# Patient Record
Sex: FEMALE | Race: WHITE | Employment: OTHER | URBAN - METROPOLITAN AREA
[De-identification: names, ages, dates, MRNs, and addresses within clinical notes are randomized per-mention and may not be internally consistent; named-entity substitution may affect disease eponyms.]

---

## 2017-04-21 ENCOUNTER — HOSPITAL ENCOUNTER (OUTPATIENT)
Dept: MAMMOGRAPHY | Age: 62
Discharge: HOME | End: 2017-04-21
Attending: SPECIALIST
Payer: COMMERCIAL

## 2017-04-21 DIAGNOSIS — Z12.31 VISIT FOR SCREENING MAMMOGRAM: ICD-10-CM

## 2017-04-21 PROCEDURE — 77067 SCR MAMMO BI INCL CAD: CPT

## 2017-06-30 ENCOUNTER — HOSPITAL ENCOUNTER (EMERGENCY)
Age: 62
Discharge: HOME | End: 2017-06-30
Attending: EMERGENCY MEDICINE
Payer: COMMERCIAL

## 2017-06-30 ENCOUNTER — APPOINTMENT (OUTPATIENT)
Dept: GENERAL RADIOLOGY | Age: 62
End: 2017-06-30
Attending: EMERGENCY MEDICINE
Payer: COMMERCIAL

## 2017-06-30 VITALS
RESPIRATION RATE: 18 BRPM | BODY MASS INDEX: 27.78 KG/M2 | TEMPERATURE: 97.9 F | OXYGEN SATURATION: 97 % | DIASTOLIC BLOOD PRESSURE: 75 MMHG | HEIGHT: 64 IN | HEART RATE: 60 BPM | WEIGHT: 162.7 LBS | SYSTOLIC BLOOD PRESSURE: 136 MMHG

## 2017-06-30 DIAGNOSIS — R07.9 CHEST PAIN, UNSPECIFIED TYPE: Primary | ICD-10-CM

## 2017-06-30 LAB
ALBUMIN SERPL BCP-MCNC: 4.4 G/DL (ref 3.5–5)
ALBUMIN/GLOB SERPL: 1.2 {RATIO} (ref 1.1–2.2)
ALP SERPL-CCNC: 127 U/L (ref 45–117)
ALT SERPL-CCNC: 40 U/L (ref 12–78)
ANION GAP BLD CALC-SCNC: 9 MMOL/L (ref 5–15)
AST SERPL W P-5'-P-CCNC: 26 U/L (ref 15–37)
BASOPHILS # BLD AUTO: 0 K/UL (ref 0–0.1)
BASOPHILS # BLD: 0 % (ref 0–1)
BILIRUB SERPL-MCNC: 0.4 MG/DL (ref 0.2–1)
BNP SERPL-MCNC: 226 PG/ML (ref 0–125)
BUN SERPL-MCNC: 16 MG/DL (ref 6–20)
BUN/CREAT SERPL: 24 (ref 12–20)
CALCIUM SERPL-MCNC: 10.1 MG/DL (ref 8.5–10.1)
CHLORIDE SERPL-SCNC: 103 MMOL/L (ref 97–108)
CO2 SERPL-SCNC: 28 MMOL/L (ref 21–32)
CREAT SERPL-MCNC: 0.67 MG/DL (ref 0.55–1.02)
D DIMER PPP FEU-MCNC: 0.19 MG/L FEU (ref 0–0.65)
EOSINOPHIL # BLD: 0 K/UL (ref 0–0.4)
EOSINOPHIL NFR BLD: 0 % (ref 0–7)
ERYTHROCYTE [DISTWIDTH] IN BLOOD BY AUTOMATED COUNT: 14.3 % (ref 11.5–14.5)
GLOBULIN SER CALC-MCNC: 3.8 G/DL (ref 2–4)
GLUCOSE SERPL-MCNC: 110 MG/DL (ref 65–100)
HCT VFR BLD AUTO: 47.9 % (ref 35–47)
HGB BLD-MCNC: 16.1 G/DL (ref 11.5–16)
LIPASE SERPL-CCNC: 139 U/L (ref 73–393)
LYMPHOCYTES # BLD AUTO: 20 % (ref 12–49)
LYMPHOCYTES # BLD: 1.5 K/UL (ref 0.8–3.5)
MCH RBC QN AUTO: 29.5 PG (ref 26–34)
MCHC RBC AUTO-ENTMCNC: 33.6 G/DL (ref 30–36.5)
MCV RBC AUTO: 87.7 FL (ref 80–99)
MONOCYTES # BLD: 0.5 K/UL (ref 0–1)
MONOCYTES NFR BLD AUTO: 6 % (ref 5–13)
NEUTS SEG # BLD: 5.6 K/UL (ref 1.8–8)
NEUTS SEG NFR BLD AUTO: 74 % (ref 32–75)
PLATELET # BLD AUTO: 235 K/UL (ref 150–400)
POTASSIUM SERPL-SCNC: 4.1 MMOL/L (ref 3.5–5.1)
PROT SERPL-MCNC: 8.2 G/DL (ref 6.4–8.2)
RBC # BLD AUTO: 5.46 M/UL (ref 3.8–5.2)
SODIUM SERPL-SCNC: 140 MMOL/L (ref 136–145)
TROPONIN I SERPL-MCNC: <0.04 NG/ML
TROPONIN I SERPL-MCNC: <0.04 NG/ML
WBC # BLD AUTO: 7.7 K/UL (ref 3.6–11)

## 2017-06-30 PROCEDURE — 99285 EMERGENCY DEPT VISIT HI MDM: CPT

## 2017-06-30 PROCEDURE — 80053 COMPREHEN METABOLIC PANEL: CPT | Performed by: EMERGENCY MEDICINE

## 2017-06-30 PROCEDURE — 93005 ELECTROCARDIOGRAM TRACING: CPT

## 2017-06-30 PROCEDURE — 85025 COMPLETE CBC W/AUTO DIFF WBC: CPT | Performed by: EMERGENCY MEDICINE

## 2017-06-30 PROCEDURE — 85379 FIBRIN DEGRADATION QUANT: CPT | Performed by: EMERGENCY MEDICINE

## 2017-06-30 PROCEDURE — 36415 COLL VENOUS BLD VENIPUNCTURE: CPT | Performed by: EMERGENCY MEDICINE

## 2017-06-30 PROCEDURE — 74011000250 HC RX REV CODE- 250: Performed by: EMERGENCY MEDICINE

## 2017-06-30 PROCEDURE — 84484 ASSAY OF TROPONIN QUANT: CPT | Performed by: EMERGENCY MEDICINE

## 2017-06-30 PROCEDURE — 83880 ASSAY OF NATRIURETIC PEPTIDE: CPT | Performed by: EMERGENCY MEDICINE

## 2017-06-30 PROCEDURE — 74011250637 HC RX REV CODE- 250/637: Performed by: EMERGENCY MEDICINE

## 2017-06-30 PROCEDURE — 83690 ASSAY OF LIPASE: CPT | Performed by: EMERGENCY MEDICINE

## 2017-06-30 RX ORDER — SODIUM CHLORIDE 0.9 % (FLUSH) 0.9 %
5-10 SYRINGE (ML) INJECTION EVERY 8 HOURS
Status: DISCONTINUED | OUTPATIENT
Start: 2017-06-30 | End: 2017-07-01 | Stop reason: HOSPADM

## 2017-06-30 RX ORDER — SODIUM CHLORIDE 0.9 % (FLUSH) 0.9 %
5-10 SYRINGE (ML) INJECTION AS NEEDED
Status: DISCONTINUED | OUTPATIENT
Start: 2017-06-30 | End: 2017-07-01 | Stop reason: HOSPADM

## 2017-06-30 RX ORDER — PANTOPRAZOLE SODIUM 40 MG/1
40 TABLET, DELAYED RELEASE ORAL DAILY
Qty: 30 TAB | Refills: 0 | Status: SHIPPED | OUTPATIENT
Start: 2017-06-30 | End: 2017-07-30

## 2017-06-30 RX ORDER — GUAIFENESIN 100 MG/5ML
81 LIQUID (ML) ORAL DAILY
Qty: 100 TAB | Refills: 0 | Status: SHIPPED | OUTPATIENT
Start: 2017-06-30 | End: 2017-07-21

## 2017-06-30 RX ADMIN — LIDOCAINE HYDROCHLORIDE 40 ML: 20 SOLUTION ORAL; TOPICAL at 17:45

## 2017-06-30 NOTE — ED NOTES
Patient resting comfortably, call bell w/in reach, no further needs expressed at this time, aware of POC.  at the bedside.

## 2017-06-30 NOTE — ED PROVIDER NOTES
HPI Comments: Oracio Jacobo is a 57 y.o. female with PMHx of HTN and hypercholesteremia, presenting ambulatory to ED c/o intermittent upper medial nonradiating pressure CP for the past week. Pt reports her CP is worse OE. She notes associated chest congestion for the past three days. Pt reports she has had intermittent burning CP radiating across her chest from shoulder to shoulder and notes episode while in the ED during examination. She endorses she had cough onset about one month ago and self diagnosed herself with bronchitis. Pt reports the cough resolved one week ago but notes she then had onset of current CP and congestion. She endorses she was seen for symptoms at [facility] three days ago and was rx'd prednisone without relief. Pt reports she went back to [facility] today, had CXR, and was rx'd amoxicillin. She endorses she is followed by Dr. Lorrain Apley with Cardiology at [facility] [address] [place] [address]. Pt notes she had a normal stress test four years ago. She endorses mother had MI at age 67. Pt reports history of asthma for which she has a rescue inhaler. She notes she has been using her inhaler without relief of her current symptoms. Pt denies history of COPD, blood clots, DM, and HTN. She denies history of abdominal surgeries/disease. Pt specifically denies SOB, difficulty swallowing, and abdominal pain. Cardiologist: Dr. Estelle Ogden  PCP: Willie Vera MD  Social Hx: current every day smoker (1 ppd); + EtOH (2 glasses of wine/day); There are no other complaints, changes, or physical findings at this time. Written by CHARAN Perez, as dictated by Lis Burgess MD.           The history is provided by the patient. Past Medical History:   Diagnosis Date    Depression     Hypercholesteremia     Hypercholesteremia     Hypertension        Past Surgical History:   Procedure Laterality Date    HX BUNIONECTOMY      HX TUBAL LIGATION           No family history on file.     Social History     Social History    Marital status:      Spouse name: N/A    Number of children: N/A    Years of education: N/A     Occupational History    Not on file. Social History Main Topics    Smoking status: Former Smoker     Quit date: 3/12/2008    Smokeless tobacco: Never Used    Alcohol use Not on file    Drug use: Not on file    Sexual activity: Not on file     Other Topics Concern    Not on file     Social History Narrative         ALLERGIES: Claritin [loratadine]; Levaquin [levofloxacin]; and Other medication    Review of Systems   Constitutional: Negative. Negative for chills and fever. HENT: Positive for congestion. Negative for rhinorrhea and trouble swallowing. Respiratory: Negative. Negative for cough, chest tightness and wheezing. Cardiovascular: Positive for chest pain (upper medial; radiating across chest from shoulder to shoulder). Negative for palpitations. Gastrointestinal: Negative. Negative for abdominal pain, constipation, nausea and vomiting. Endocrine: Negative. Genitourinary: Negative. Negative for decreased urine volume, flank pain, hematuria and pelvic pain. Musculoskeletal: Negative. Negative for back pain and neck pain. Skin: Negative. Negative for color change, pallor and rash. Neurological: Negative. Negative for dizziness, seizures, weakness, numbness and headaches. Hematological: Negative. Negative for adenopathy. Psychiatric/Behavioral: Negative. All other systems reviewed and are negative. Vitals:    06/30/17 1730 06/30/17 1800 06/30/17 1830 06/30/17 1900   BP: 146/80 140/83 147/76 137/76   Pulse: 61 66 61 63   Resp: 16 12 14 11   Temp:       SpO2: 99% 99% 95% 99%   Weight:       Height:                Physical Exam   Constitutional: She is oriented to person, place, and time. She appears well-developed and well-nourished. No distress. HENT:   Head: Normocephalic and atraumatic. Mouth/Throat: No oropharyngeal exudate. Eyes: Conjunctivae are normal. Pupils are equal, round, and reactive to light. Right eye exhibits no discharge. Left eye exhibits no discharge. No scleral icterus. Neck: Normal range of motion. Neck supple. No JVD present. Cardiovascular: Normal rate, regular rhythm, normal heart sounds and intact distal pulses. Exam reveals no gallop and no friction rub. No murmur heard. Pulmonary/Chest: Effort normal and breath sounds normal. No stridor. No respiratory distress. She has no wheezes. She has no rales. She exhibits no tenderness. Abdominal: Soft. Bowel sounds are normal. She exhibits no distension and no mass. There is no tenderness. There is no rebound and no guarding. Neurological: She is alert and oriented to person, place, and time. She displays normal reflexes. No cranial nerve deficit. She exhibits normal muscle tone. Coordination normal.   Skin: Skin is warm. No rash noted. She is not diaphoretic. No pallor. Nursing note and vitals reviewed. MDM  Number of Diagnoses or Management Options  Chest pain, unspecified type:   Diagnosis management comments: DDx: bronchitis, PNA, CAD, CHF, PE, esophageal spasm, hepatitis, pancreatitis, musculoskeletal pain. Impression Plan: history of tobacco abuse as well as HLD, presents with initial cough and congestion for three weeks that she self diagnosed with bronchitis. Subsequently F/U to [facility] and was given steroids on the first visit and amoxicillin today. She had a normal stress test four years ago. She is here with recurrent burning tightness in her chest. Has initial non-ischemic EKG. Will check enzymes and repeat EKG.  If remains normal, will F/U outpatient with her cardiologist.          Amount and/or Complexity of Data Reviewed  Clinical lab tests: ordered and reviewed  Tests in the medicine section of CPT®: ordered and reviewed  Obtain history from someone other than the patient: yes  Review and summarize past medical records: yes  Discuss the patient with other providers: yes (Cardiology)  Independent visualization of images, tracings, or specimens: yes    Risk of Complications, Morbidity, and/or Mortality  Presenting problems: moderate  Diagnostic procedures: moderate  Management options: moderate    Patient Progress  Patient progress: stable    Procedures    EKG interpretation: (Preliminary) 1656  Rhythm: sinus rhythm with short AK and incomplete RBBB; and regular . Rate (approx.): 67; Axis: normal; AK interval: short; QRS interval: normal ; ST/T wave: normal; no prior EKG for comparison. Written by CHARAN Colon, as dictated by Selma Ellis MD.     Progress Note:  5:21 PM  Reviewed the pt's CXR from [facility]. There are no acute abnormalities and the pt's heart is a normal size. Written by CHARAN Colon, as dictated by Selma Ellis MD.    CONSULT NOTE:   7:21 PM  Selma Ellis MD spoke with Dr. Irma Casper,   Specialty: Cardiology  Discussed pt's hx, disposition, and available diagnostic and imaging results. Reviewed care plans. Consultant agrees with plans as outlined. Dr. Irma Casper recommends repeating the enzymes and then the pt can be discharged.    Written by CHARAN Colon, as dictated by Selma Ellis MD.     LABORATORY TESTS:  Recent Results (from the past 12 hour(s))   EKG, 12 LEAD, INITIAL    Collection Time: 06/30/17  4:56 PM   Result Value Ref Range    Ventricular Rate 67 BPM    Atrial Rate 67 BPM    P-R Interval 96 ms    QRS Duration 110 ms    Q-T Interval 444 ms    QTC Calculation (Bezet) 469 ms    Calculated P Axis 58 degrees    Calculated R Axis 31 degrees    Calculated T Axis 32 degrees    Diagnosis       Sinus rhythm with short AK  Incomplete right bundle branch block  No previous ECGs available     CBC WITH AUTOMATED DIFF    Collection Time: 06/30/17  5:16 PM   Result Value Ref Range    WBC 7.7 3.6 - 11.0 K/uL    RBC 5.46 (H) 3.80 - 5.20 M/uL    HGB 16.1 (H) 11.5 - 16.0 g/dL    HCT 47.9 (H) 35.0 - 47.0 %    MCV 87.7 80.0 - 99.0 FL    MCH 29.5 26.0 - 34.0 PG    MCHC 33.6 30.0 - 36.5 g/dL    RDW 14.3 11.5 - 14.5 %    PLATELET 509 127 - 415 K/uL    NEUTROPHILS 74 32 - 75 %    LYMPHOCYTES 20 12 - 49 %    MONOCYTES 6 5 - 13 %    EOSINOPHILS 0 0 - 7 %    BASOPHILS 0 0 - 1 %    ABS. NEUTROPHILS 5.6 1.8 - 8.0 K/UL    ABS. LYMPHOCYTES 1.5 0.8 - 3.5 K/UL    ABS. MONOCYTES 0.5 0.0 - 1.0 K/UL    ABS. EOSINOPHILS 0.0 0.0 - 0.4 K/UL    ABS. BASOPHILS 0.0 0.0 - 0.1 K/UL   METABOLIC PANEL, COMPREHENSIVE    Collection Time: 06/30/17  5:16 PM   Result Value Ref Range    Sodium 140 136 - 145 mmol/L    Potassium 4.1 3.5 - 5.1 mmol/L    Chloride 103 97 - 108 mmol/L    CO2 28 21 - 32 mmol/L    Anion gap 9 5 - 15 mmol/L    Glucose 110 (H) 65 - 100 mg/dL    BUN 16 6 - 20 MG/DL    Creatinine 0.67 0.55 - 1.02 MG/DL    BUN/Creatinine ratio 24 (H) 12 - 20      GFR est AA >60 >60 ml/min/1.73m2    GFR est non-AA >60 >60 ml/min/1.73m2    Calcium 10.1 8.5 - 10.1 MG/DL    Bilirubin, total 0.4 0.2 - 1.0 MG/DL    ALT (SGPT) 40 12 - 78 U/L    AST (SGOT) 26 15 - 37 U/L    Alk.  phosphatase 127 (H) 45 - 117 U/L    Protein, total 8.2 6.4 - 8.2 g/dL    Albumin 4.4 3.5 - 5.0 g/dL    Globulin 3.8 2.0 - 4.0 g/dL    A-G Ratio 1.2 1.1 - 2.2     LIPASE    Collection Time: 06/30/17  5:16 PM   Result Value Ref Range    Lipase 139 73 - 393 U/L   D DIMER    Collection Time: 06/30/17  5:16 PM   Result Value Ref Range    D-dimer 0.19 0.00 - 0.65 mg/L FEU   TROPONIN I    Collection Time: 06/30/17  5:16 PM   Result Value Ref Range    Troponin-I, Qt. <0.04 <0.05 ng/mL   PRO-BNP    Collection Time: 06/30/17  5:16 PM   Result Value Ref Range    NT pro- (H) 0 - 125 PG/ML   TROPONIN I    Collection Time: 06/30/17  8:06 PM   Result Value Ref Range    Troponin-I, Qt. <0.04 <0.05 ng/mL     MEDICATIONS GIVEN:  Medications   sodium chloride (NS) flush 5-10 mL (not administered)   sodium chloride (NS) flush 5-10 mL (not administered)   mylanta/viscous lidocaine (RAINA)(GI COCKTAIL) (40 mL Oral Given 6/30/17 3295)       IMPRESSION:  1. Chest pain, unspecified type        PLAN:  1. Current Discharge Medication List      START taking these medications    Details   aspirin (ASPIRIN CHILDRENS) 81 mg chewable tablet Take 1 Tab by mouth daily. Qty: 100 Tab, Refills: 0      pantoprazole (PROTONIX) 40 mg tablet Take 1 Tab by mouth daily for 30 days. Qty: 30 Tab, Refills: 0         CONTINUE these medications which have NOT CHANGED    Details   lamoTRIgine (LAMICTAL) 200 mg tablet Take 200 mg by mouth daily. ibuprofen (ADVIL) 200 mg tablet Take  by mouth. simvastatin (ZOCOR) 20 mg tablet Take  by mouth nightly. multivitamin (ONE A DAY) tablet Take 1 Tab by mouth daily. allergy injection            2.   Follow-up Information     Follow up With Details Comments Contact Info    Wili Morrow MD Call in 2 days  [address]  [phone]      [facility] EMERGENCY DEPT  If symptoms worsen [address]  [phone]        Return to ED if worse     DISCHARGE NOTE  8:55 PM  The patient has been re-evaluated and is ready for discharge. Reviewed available results with patient. Counseled pt on diagnosis and care plan. Pt has expressed understanding, and all questions have been answered. Pt agrees with plan and agrees to F/U as recommended, or return to the ED if their sxs worsen. Discharge instructions have been provided and explained to the pt, along with reasons to return to the ED. Written by Panfilo Reyes, ED Scribe, as dictated by Odilia Mendoza MD.    This note is prepared by Panfilo Reyes, acting as Scribe for Odilia Mendoza MD.    Odilia Mendoza MD: The scribe's documentation has been prepared under my direction and personally reviewed by me in its entirety.  I confirm that the note above accurately reflects all work, treatment, procedures, and medical decision making performed by me.

## 2017-06-30 NOTE — ED NOTES
Patient presents to ED with C/O \"swelling and pressure\" in the throat, chest pressure, and burning from the shoulder to shoulder that started today. The pt stated the symptoms started at 3 pm and went away 30 minutes later. The pt denies trouble swallowing, SOB, N/V/D, fever, chills, and urinary symptoms. Patient is A&Ox3, call bell w/in reach, and aware of plan of care. The patient is in NAD.  at the bedside.

## 2017-07-01 LAB
ATRIAL RATE: 61 BPM
ATRIAL RATE: 67 BPM
CALCULATED P AXIS, ECG09: 44 DEGREES
CALCULATED P AXIS, ECG09: 58 DEGREES
CALCULATED R AXIS, ECG10: 12 DEGREES
CALCULATED R AXIS, ECG10: 31 DEGREES
CALCULATED T AXIS, ECG11: 14 DEGREES
CALCULATED T AXIS, ECG11: 32 DEGREES
DIAGNOSIS, 93000: NORMAL
DIAGNOSIS, 93000: NORMAL
P-R INTERVAL, ECG05: 90 MS
P-R INTERVAL, ECG05: 96 MS
Q-T INTERVAL, ECG07: 444 MS
Q-T INTERVAL, ECG07: 476 MS
QRS DURATION, ECG06: 110 MS
QRS DURATION, ECG06: 112 MS
QTC CALCULATION (BEZET), ECG08: 469 MS
QTC CALCULATION (BEZET), ECG08: 479 MS
VENTRICULAR RATE, ECG03: 61 BPM
VENTRICULAR RATE, ECG03: 67 BPM

## 2017-07-01 NOTE — ED NOTES
Patient discharged and given discharge instructions by Bhavya Anaya MD. Patient had an opportunity to ask questions. Patient verbalized understanding of discharge instructions. Patient ambulatory from ED, discharge instructions and prescriptions in hand. Patient accompanied by . Pt refused wheelchair.

## 2017-07-01 NOTE — DISCHARGE INSTRUCTIONS
Chest Pain: Care Instructions  Your Care Instructions  There are many things that can cause chest pain. Some are not serious and will get better on their own in a few days. But some kinds of chest pain need more testing and treatment. Your doctor may have recommended a follow-up visit in the next 8 to 12 hours. If you are not getting better, you may need more tests or treatment. Even though your doctor has released you, you still need to watch for any problems. The doctor carefully checked you, but sometimes problems can develop later. If you have new symptoms or if your symptoms do not get better, get medical care right away. If you have worse or different chest pain or pressure that lasts more than 5 minutes or you passed out (lost consciousness), call 911 or seek other emergency help right away. A medical visit is only one step in your treatment. Even if you feel better, you still need to do what your doctor recommends, such as going to all suggested follow-up appointments and taking medicines exactly as directed. This will help you recover and help prevent future problems. How can you care for yourself at home? · Rest until you feel better. · Take your medicine exactly as prescribed. Call your doctor if you think you are having a problem with your medicine. · Do not drive after taking a prescription pain medicine. When should you call for help? Call 911 if:  · You passed out (lost consciousness). · You have severe difficulty breathing. · You have symptoms of a heart attack. These may include:  ¨ Chest pain or pressure, or a strange feeling in your chest.  ¨ Sweating. ¨ Shortness of breath. ¨ Nausea or vomiting. ¨ Pain, pressure, or a strange feeling in your back, neck, jaw, or upper belly or in one or both shoulders or arms. ¨ Lightheadedness or sudden weakness. ¨ A fast or irregular heartbeat.   After you call 911, the  may tell you to chew 1 adult-strength or 2 to 4 low-dose aspirin. Wait for an ambulance. Do not try to drive yourself. Call your doctor today if:  · You have any trouble breathing. · Your chest pain gets worse. · You are dizzy or lightheaded, or you feel like you may faint. · You are not getting better as expected. · You are having new or different chest pain. Where can you learn more? Go to http://rozina-corinna.info/. Enter A120 in the search box to learn more about \"Chest Pain: Care Instructions. \"  Current as of: 2017  Content Version: 11.3  © 9579-3206 [employer]. Care instructions adapted under license by [employer] (which disclaims liability or warranty for this information). If you have questions about a medical condition or this instruction, always ask your healthcare professional. [address] any warranty or liability for your use of this information. PiniOn Activation    Thank you for requesting access to P[employer]On. Please follow the instructions below to securely access and download your online medical record. PiniOn allows you to send messages to your doctor, view your test results, renew your prescriptions, schedule appointments, and more. How Do I Sign Up? 1. In your internet browser, go to [email or web address]  2. Click on the First Time User? Click Here link in the Sign In box. You will be redirect to the New Member Sign Up page. 3. Enter your PiniOn Access Code exactly as it appears below. You will not need to use this code after youve completed the sign-up process. If you do not sign up before the expiration date, you must request a new code. PiniOn Access Code: [email or web address]  Expires: 2017 11:06 AM (This is the date your PiniOn access code will )    4. Enter the last four digits of your Social Security Number (xxxx) and Date of Birth (mm/dd/yyyy) as indicated and click Submit. You will be taken to the next sign-up page.   5. Create a PiniOn ID. This will be your Cook[employer] login ID and cannot be changed, so think of one that is secure and easy to remember. 6. Create a Cook[employer] password. You can change your password at any time. 7. Enter your Password Reset Question and Answer. This can be used at a later time if you forget your password. 8. Enter your e-mail address. You will receive e-mail notification when new information is available in [address] 9. Click Sign Up. You can now view and download portions of your medical record. 10. Click the Download Summary menu link to download a portable copy of your medical information. Additional Information    If you have questions, please visit the Frequently Asked Questions section of the [email or web address] website at [email or web address] Remember, [email or web address] is NOT to be used for urgent needs. For medical emergencies, dial 911.

## 2017-07-11 ENCOUNTER — APPOINTMENT (OUTPATIENT)
Dept: GENERAL RADIOLOGY | Age: 62
End: 2017-07-11
Attending: EMERGENCY MEDICINE
Payer: COMMERCIAL

## 2017-07-11 ENCOUNTER — HOSPITAL ENCOUNTER (EMERGENCY)
Age: 62
Discharge: HOME | End: 2017-07-11
Attending: EMERGENCY MEDICINE | Admitting: EMERGENCY MEDICINE
Payer: COMMERCIAL

## 2017-07-11 VITALS
TEMPERATURE: 98.3 F | WEIGHT: 161.38 LBS | DIASTOLIC BLOOD PRESSURE: 72 MMHG | BODY MASS INDEX: 27.55 KG/M2 | HEART RATE: 92 BPM | SYSTOLIC BLOOD PRESSURE: 160 MMHG | OXYGEN SATURATION: 99 % | HEIGHT: 64 IN | RESPIRATION RATE: 18 BRPM

## 2017-07-11 DIAGNOSIS — R00.2 PALPITATIONS: Primary | ICD-10-CM

## 2017-07-11 DIAGNOSIS — E86.0 DEHYDRATION: ICD-10-CM

## 2017-07-11 LAB
ALBUMIN SERPL BCP-MCNC: 3.8 G/DL (ref 3.5–5)
ALBUMIN/GLOB SERPL: 1.2 {RATIO} (ref 1.1–2.2)
ALP SERPL-CCNC: 101 U/L (ref 45–117)
ALT SERPL-CCNC: 29 U/L (ref 12–78)
ANION GAP BLD CALC-SCNC: 8 MMOL/L (ref 5–15)
AST SERPL W P-5'-P-CCNC: 14 U/L (ref 15–37)
BASOPHILS # BLD AUTO: 0 K/UL (ref 0–0.1)
BASOPHILS # BLD: 0 % (ref 0–1)
BILIRUB SERPL-MCNC: 0.4 MG/DL (ref 0.2–1)
BUN SERPL-MCNC: 16 MG/DL (ref 6–20)
BUN/CREAT SERPL: 21 (ref 12–20)
CALCIUM SERPL-MCNC: 9.3 MG/DL (ref 8.5–10.1)
CHLORIDE SERPL-SCNC: 110 MMOL/L (ref 97–108)
CK MB CFR SERPL CALC: 2.2 % (ref 0–2.5)
CK MB SERPL-MCNC: 1.8 NG/ML (ref 5–25)
CK SERPL-CCNC: 81 U/L (ref 26–192)
CO2 SERPL-SCNC: 25 MMOL/L (ref 21–32)
CREAT SERPL-MCNC: 0.76 MG/DL (ref 0.55–1.02)
EOSINOPHIL # BLD: 0.1 K/UL (ref 0–0.4)
EOSINOPHIL NFR BLD: 1 % (ref 0–7)
ERYTHROCYTE [DISTWIDTH] IN BLOOD BY AUTOMATED COUNT: 14.5 % (ref 11.5–14.5)
GLOBULIN SER CALC-MCNC: 3.2 G/DL (ref 2–4)
GLUCOSE SERPL-MCNC: 119 MG/DL (ref 65–100)
HCT VFR BLD AUTO: 43.9 % (ref 35–47)
HGB BLD-MCNC: 14.7 G/DL (ref 11.5–16)
LYMPHOCYTES # BLD AUTO: 21 % (ref 12–49)
LYMPHOCYTES # BLD: 1.7 K/UL (ref 0.8–3.5)
MCH RBC QN AUTO: 29.1 PG (ref 26–34)
MCHC RBC AUTO-ENTMCNC: 33.5 G/DL (ref 30–36.5)
MCV RBC AUTO: 86.9 FL (ref 80–99)
MONOCYTES # BLD: 0.4 K/UL (ref 0–1)
MONOCYTES NFR BLD AUTO: 5 % (ref 5–13)
NEUTS SEG # BLD: 5.8 K/UL (ref 1.8–8)
NEUTS SEG NFR BLD AUTO: 73 % (ref 32–75)
PLATELET # BLD AUTO: 231 K/UL (ref 150–400)
POTASSIUM SERPL-SCNC: 4.2 MMOL/L (ref 3.5–5.1)
PROT SERPL-MCNC: 7 G/DL (ref 6.4–8.2)
RBC # BLD AUTO: 5.05 M/UL (ref 3.8–5.2)
SODIUM SERPL-SCNC: 143 MMOL/L (ref 136–145)
TROPONIN I SERPL-MCNC: <0.04 NG/ML
WBC # BLD AUTO: 8 K/UL (ref 3.6–11)

## 2017-07-11 PROCEDURE — 93005 ELECTROCARDIOGRAM TRACING: CPT

## 2017-07-11 PROCEDURE — 36415 COLL VENOUS BLD VENIPUNCTURE: CPT | Performed by: EMERGENCY MEDICINE

## 2017-07-11 PROCEDURE — 84484 ASSAY OF TROPONIN QUANT: CPT | Performed by: EMERGENCY MEDICINE

## 2017-07-11 PROCEDURE — 71020 XR CHEST PA LAT: CPT

## 2017-07-11 PROCEDURE — 85025 COMPLETE CBC W/AUTO DIFF WBC: CPT | Performed by: EMERGENCY MEDICINE

## 2017-07-11 PROCEDURE — 99283 EMERGENCY DEPT VISIT LOW MDM: CPT

## 2017-07-11 PROCEDURE — 82550 ASSAY OF CK (CPK): CPT | Performed by: EMERGENCY MEDICINE

## 2017-07-11 PROCEDURE — 80053 COMPREHEN METABOLIC PANEL: CPT | Performed by: EMERGENCY MEDICINE

## 2017-07-11 NOTE — DISCHARGE INSTRUCTIONS
Dehydration: Care Instructions  Your Care Instructions  Dehydration happens when your body loses too much fluid. This might happen when you do not drink enough water or you lose large amounts of fluids from your body because of diarrhea, vomiting, or sweating. Severe dehydration can be life-threatening. Water and minerals called electrolytes help put your body fluids back in balance. Learn the early signs of fluid loss, and drink more fluids to prevent dehydration. Follow-up care is a key part of your treatment and safety. Be sure to make and go to all appointments, and call your doctor if you are having problems. It's also a good idea to know your test results and keep a list of the medicines you take. How can you care for yourself at home? · To prevent dehydration, drink plenty of fluids, enough so that your urine is light yellow or clear like water. Choose water and other caffeine-free clear liquids until you feel better. If you have kidney, heart, or liver disease and have to limit fluids, talk with your doctor before you increase the amount of fluids you drink. · If you do not feel like eating or drinking, try taking small sips of water, sports drinks, or other rehydration drinks. · Get plenty of rest.  To prevent dehydration  · Add more fluids to your diet and daily routine, unless your doctor has told you not to. · During hot weather, drink more fluids. Drink even more fluids if you exercise a lot. Stay away from drinks with alcohol or caffeine. · Watch for the symptoms of dehydration. These include:  ¨ A dry, sticky mouth. ¨ Dark yellow urine, and not much of it. ¨ Dry and sunken eyes. ¨ Feeling very tired. · Learn what problems can lead to dehydration. These include:  ¨ Diarrhea, fever, and vomiting. ¨ Any illness with a fever, such as pneumonia or the flu. ¨ Activities that cause heavy sweating, such as endurance races and heavy outdoor work in hot or humid weather.   ¨ Alcohol or drug abuse or withdrawal.  ¨ Certain medicines, such as cold and allergy pills (antihistamines), diet pills (diuretics), and laxatives. ¨ Certain diseases, such as diabetes, cancer, and heart or kidney disease. When should you call for help? Call 911 anytime you think you may need emergency care. For example, call if:  · You passed out (lost consciousness). Call your doctor now or seek immediate medical care if:  · You are confused and cannot think clearly. · You are dizzy or lightheaded, or you feel like you may faint. · You have signs of needing more fluids. You have sunken eyes and a dry mouth, and you pass only a little dark urine. · You cannot keep fluids down. Watch closely for changes in your health, and be sure to contact your doctor if:  · You are not making tears. · Your skin is very dry and sags slowly back into place after you pinch it. · Your mouth and eyes are very dry. Where can you learn more? Go to http://rozina-corinna.info/. Enter K508 in the search box to learn more about \"Dehydration: Care Instructions. \"  Current as of: March 20, 2017  Content Version: 11.3  © 6563-0971 [employer]. Care instructions adapted under license by [employer] (which disclaims liability or warranty for this information). If you have questions about a medical condition or this instruction, always ask your healthcare professional. [place] [address] any warranty or liability for your use of this information. Palpitations: Care Instructions  Your Care Instructions    Heart palpitations are the uncomfortable sensation that your heart is beating fast or irregularly. You might feel pounding or fluttering in your chest. It might feel like your heart is skipping a beat. Although palpitations may be caused by a heart problem, they also occur because of stress, fatigue, or use of alcohol, caffeine, or nicotine.  Many medicines, including diet pills, antihistamines, decongestants, and some herbal products, can cause heart palpitations. Nearly everyone has palpitations from time to time. Depending on your symptoms, your doctor may need to do more tests to try to find the cause of your palpitations. Follow-up care is a key part of your treatment and safety. Be sure to make and go to all appointments, and call your doctor if you are having problems. It's also a good idea to know your test results and keep a list of the medicines you take. How can you care for yourself at home? · Avoid caffeine, nicotine, and excess alcohol. · Do not take illegal drugs, such as methamphetamines and cocaine. · Do not take weight loss or diet medicines unless you talk with your doctor first.  · Get plenty of sleep. · Do not overeat. · If you have palpitations again, take deep breaths and try to relax. This may slow a racing heart. · If you start to feel lightheaded, lie down to avoid injuries that might result if you pass out and fall down. · Keep a record of your palpitations and bring it to your next doctor's appointment. Write down:  ¨ The date and time. ¨ Your pulse. (If your heart is beating fast, it may be hard to count your pulse.)  ¨ What you were doing when the palpitations started. ¨ How long the palpitations lasted. ¨ Any other symptoms. · If an activity causes palpitations, slow down or stop. Talk to your doctor before you do that activity again. · Take your medicines exactly as prescribed. Call your doctor if you think you are having a problem with your medicine. When should you call for help? Call 911 anytime you think you may need emergency care. For example, call if:  · You passed out (lost consciousness). · You have symptoms of a heart attack. These may include:  ¨ Chest pain or pressure, or a strange feeling in the chest.  ¨ Sweating. ¨ Shortness of breath.   ¨ Pain, pressure, or a strange feeling in the back, neck, jaw, or upper belly or in one or both shoulders or arms. ¨ Lightheadedness or sudden weakness. ¨ A fast or irregular heartbeat. After you call 911, the  may tell you to chew 1 adult-strength or 2 to 4 low-dose aspirin. Wait for an ambulance. Do not try to drive yourself. · You have symptoms of a stroke. These may include:  ¨ Sudden numbness, tingling, weakness, or loss of movement in your face, arm, or leg, especially on only one side of your body. ¨ Sudden vision changes. ¨ Sudden trouble speaking. ¨ Sudden confusion or trouble understanding simple statements. ¨ Sudden problems with walking or balance. ¨ A sudden, severe headache that is different from past headaches. Call your doctor now or seek immediate medical care if:  · You have heart palpitations and:  ¨ Are dizzy or lightheaded, or you feel like you may faint. ¨ Have new or increased shortness of breath. Watch closely for changes in your health, and be sure to contact your doctor if:  · You continue to have heart palpitations. Where can you learn more? Go to http://rozina-corinna.info/. Enter R508 in the search box to learn more about \"Palpitations: Care Instructions. \"  Current as of: April 3, 2017  Content Version: 11.3  © 8565-2769 [employer]. Care instructions adapted under license by [employer] (which disclaims liability or warranty for this information). If you have questions about a medical condition or this instruction, always ask your healthcare professional. [place] [address] any warranty or liability for your use of this information.

## 2017-07-11 NOTE — ED PROVIDER NOTES
HPI Comments: David Jose is a 57 y.o. female, pmhx significant for HTN, HLD, depression, who presents ambulatory to the ED c/o sudden onset palpitations, hyperactivity and \"hot flashes\"  X today. Pt reports that she was cleaning her house when her palpitations began. She states that it does not feel as though her heart is beating hard, just faster than usual at 130 bpm. She is unsure what caused her sxs and does not have a hx of similar. Pt has been evaluated by  Dr. Mabel Aquino in the past for CP, but has negative cardiac tests. Pt has not had her thyroid evaluated. She specifically denies any fevers, chills, nausea, vomiting, chest pain, shortness of breath, headache, rash, diarrhea, sweating or weight loss. PCP: Jeana Michele MD      Social Hx: +tobacco (1 ppd), +EtOH (2 glasses of wine/day), -Illicit Drugs   FHx: no pertinent family hx   Medication Allergies: claritin, levaquin, tetracycline, serovent      There are no other complaints, changes, or physical findings at this time. The history is provided by the patient. Past Medical History:   Diagnosis Date    Depression     Hypercholesteremia     Hypercholesteremia     Hypertension        Past Surgical History:   Procedure Laterality Date    HX BUNIONECTOMY      HX TUBAL LIGATION           No family history on file. Social History     Social History    Marital status:      Spouse name: N/A    Number of children: N/A    Years of education: N/A     Occupational History    Not on file. Social History Main Topics    Smoking status: Current Every Day Smoker     Packs/day: 1.00     Last attempt to quit: 3/12/2008    Smokeless tobacco: Never Used    Alcohol use Yes      Comment: 2 glasses of wine a day    Drug use: Not on file    Sexual activity: Not on file     Other Topics Concern    Not on file     Social History Narrative         ALLERGIES: Claritin [loratadine]; Levaquin [levofloxacin];  Other medication; and Tetracycline    Review of Systems   Constitutional: Negative. Negative for activity change, appetite change, chills, diaphoresis, fatigue, fever and unexpected weight change. \"hot flash\"   HENT: Negative. Negative for congestion, hearing loss, rhinorrhea, sneezing and voice change. Eyes: Negative. Negative for pain and visual disturbance. Respiratory: Negative. Negative for apnea, cough, choking, chest tightness and shortness of breath. Cardiovascular: Positive for palpitations. Negative for chest pain. Gastrointestinal: Negative. Negative for abdominal distention, abdominal pain, blood in stool, diarrhea, nausea and vomiting. Genitourinary: Negative. Negative for difficulty urinating, flank pain, frequency and urgency. No discharge   Musculoskeletal: Negative. Negative for arthralgias, back pain, myalgias and neck stiffness. Skin: Negative. Negative for color change and rash. Neurological: Negative. Negative for dizziness, seizures, syncope, speech difficulty, weakness, numbness and headaches. Hematological: Negative for adenopathy. Psychiatric/Behavioral: Negative for agitation, behavioral problems, dysphoric mood and suicidal ideas. The patient is hyperactive. The patient is not nervous/anxious. All other systems reviewed and are negative. Vitals:    07/11/17 1705 07/11/17 1903   BP: 171/79 160/72   Pulse: 94 92   Resp: 16 18   Temp: 98.3 °F (36.8 °C)    SpO2: 99%    Weight: 73.2 kg (161 lb 6 oz)    Height: 5' 4\" (1.626 m)             Physical Exam   Constitutional: She is oriented to person, place, and time. She appears well-developed and well-nourished. No distress. HENT:   Head: Normocephalic and atraumatic. Mouth/Throat: Oropharynx is clear and moist. No oropharyngeal exudate. Eyes: Conjunctivae and EOM are normal. Pupils are equal, round, and reactive to light. Right eye exhibits no discharge. Left eye exhibits no discharge.    Neck: Normal range of motion. Neck supple. Cardiovascular: Normal rate, regular rhythm and intact distal pulses. Exam reveals no gallop and no friction rub. No murmur heard. Pulmonary/Chest: Effort normal and breath sounds normal. No respiratory distress. She has no wheezes. She has no rales. She exhibits no tenderness. Abdominal: Soft. Bowel sounds are normal. She exhibits no distension and no mass. There is no tenderness. There is no rebound and no guarding. Musculoskeletal: Normal range of motion. She exhibits no edema. Lymphadenopathy:     She has no cervical adenopathy. Neurological: She is alert and oriented to person, place, and time. No cranial nerve deficit. Coordination normal.   Skin: Skin is warm and dry. No rash noted. No erythema. Psychiatric: She has a normal mood and affect. Nursing note and vitals reviewed. MDM  Number of Diagnoses or Management Options  Dehydration:   Palpitations:   Diagnosis management comments: DDx: ACS, arrhythmia, dehydration, electrolyte abnormality. Will assess with basic cardiac labs, EKG and CXR       Amount and/or Complexity of Data Reviewed  Clinical lab tests: ordered and reviewed  Tests in the radiology section of CPT®: ordered and reviewed  Tests in the medicine section of CPT®: ordered and reviewed  Review and summarize past medical records: yes  Independent visualization of images, tracings, or specimens: yes    Patient Progress  Patient progress: stable    ED Course       Procedures  Chief Complaint   Patient presents with    Irregular Heart Beat     Patient reports a sensation of rapid heart beat since early this morning. 6:59 PM  The patients presenting problems have been discussed, and they are in agreement with the care plan formulated and outlined with them. I have encouraged them to ask questions as they arise throughout their visit.     LABS REVIEWED:  Recent Results (from the past 24 hour(s))   EKG, 12 LEAD, INITIAL    Collection Time: 07/11/17 5:06 PM   Result Value Ref Range    Ventricular Rate 89 BPM    Atrial Rate 89 BPM    P-R Interval 106 ms    QRS Duration 106 ms    Q-T Interval 382 ms    QTC Calculation (Bezet) 464 ms    Calculated P Axis 40 degrees    Calculated R Axis 10 degrees    Calculated T Axis 10 degrees    Diagnosis       Sinus rhythm with short MS  Incomplete right bundle branch block  Borderline ECG  When compared with ECG of 30-JUN-2017 19:59,  aberrant conduction is no longer present     CBC WITH AUTOMATED DIFF    Collection Time: 07/11/17  5:14 PM   Result Value Ref Range    WBC 8.0 3.6 - 11.0 K/uL    RBC 5.05 3.80 - 5.20 M/uL    HGB 14.7 11.5 - 16.0 g/dL    HCT 43.9 35.0 - 47.0 %    MCV 86.9 80.0 - 99.0 FL    MCH 29.1 26.0 - 34.0 PG    MCHC 33.5 30.0 - 36.5 g/dL    RDW 14.5 11.5 - 14.5 %    PLATELET 686 013 - 248 K/uL    NEUTROPHILS 73 32 - 75 %    LYMPHOCYTES 21 12 - 49 %    MONOCYTES 5 5 - 13 %    EOSINOPHILS 1 0 - 7 %    BASOPHILS 0 0 - 1 %    ABS. NEUTROPHILS 5.8 1.8 - 8.0 K/UL    ABS. LYMPHOCYTES 1.7 0.8 - 3.5 K/UL    ABS. MONOCYTES 0.4 0.0 - 1.0 K/UL    ABS. EOSINOPHILS 0.1 0.0 - 0.4 K/UL    ABS. BASOPHILS 0.0 0.0 - 0.1 K/UL   METABOLIC PANEL, COMPREHENSIVE    Collection Time: 07/11/17  5:14 PM   Result Value Ref Range    Sodium 143 136 - 145 mmol/L    Potassium 4.2 3.5 - 5.1 mmol/L    Chloride 110 (H) 97 - 108 mmol/L    CO2 25 21 - 32 mmol/L    Anion gap 8 5 - 15 mmol/L    Glucose 119 (H) 65 - 100 mg/dL    BUN 16 6 - 20 MG/DL    Creatinine 0.76 0.55 - 1.02 MG/DL    BUN/Creatinine ratio 21 (H) 12 - 20      GFR est AA >60 >60 ml/min/1.73m2    GFR est non-AA >60 >60 ml/min/1.73m2    Calcium 9.3 8.5 - 10.1 MG/DL    Bilirubin, total 0.4 0.2 - 1.0 MG/DL    ALT (SGPT) 29 12 - 78 U/L    AST (SGOT) 14 (L) 15 - 37 U/L    Alk.  phosphatase 101 45 - 117 U/L    Protein, total 7.0 6.4 - 8.2 g/dL    Albumin 3.8 3.5 - 5.0 g/dL    Globulin 3.2 2.0 - 4.0 g/dL    A-G Ratio 1.2 1.1 - 2.2     TROPONIN I    Collection Time: 07/11/17  5:14 PM Result Value Ref Range    Troponin-I, Qt. <0.04 <0.05 ng/mL   CK W/ CKMB & INDEX    Collection Time: 07/11/17  5:14 PM   Result Value Ref Range    CK 81 26 - 192 U/L    CK - MB 1.8 <3.6 NG/ML    CK-MB Index 2.2 0 - 2.5         VITAL SIGNS:  Patient Vitals for the past 12 hrs:   Temp Pulse Resp BP SpO2   07/11/17 1903 - 92 18 160/72 -   07/11/17 1705 98.3 °F (36.8 °C) 94 16 171/79 99 %       RADIOLOGY RESULTS:  The following have been ordered and reviewed:  CXR Results  (Last 48 hours)               07/11/17 1842  XR CHEST PA LAT Final result    Impression:  IMPRESSION: No acute abnormality identified. Narrative:  EXAM:  XR CHEST PA LAT       INDICATION:   rapid heart rate       COMPARISON: None. FINDINGS: PA and lateral radiographs of the chest demonstrate clear lungs. The   cardiac and mediastinal contours and pulmonary vascularity are normal.  The   bones and soft tissues are within normal limits. EKG interpretation: (Preliminary) 1706  Rhythm: normal sinus rhythm with incomplete RBBB; and regular . Rate (approx.): 89 bpm; Axis: normal; P wave: shortened; QRS interval: normal ; ST/T wave: normal;     DIAGNOSIS:    1. Palpitations    2. Dehydration        PLAN:  Follow-up Information     Follow up With Details Comments [address] [facility] [address]  [facility]  [phone]          Discharge Medication List as of 7/11/2017  6:59 PM            ED COURSE: The patients hospital course has been uncomplicated. Thank you for allowing us to provide you with excellent care today. We hope we addressed all of your concerns and needs. We strive to provide excellent quality care in the Emergency Department. Please rate us as excellent, as anything less than excellent does not meet our expectations. If you feel that you have not received excellent quality care or timely care, please ask to speak to the nurse manager.  Please choose us in the future for your continued health care needs. The exam and treatment you received in the Emergency Department were for an urgent problem and are not intended as complete care. It is important that you follow-up with a doctor, nurse practitioner, or physician assistant to:  (1) confirm your diagnosis,  (2) re-evaluation of changes in your illness and treatment, and  (3) for ongoing care. If your symptoms become worse or you do not improve as expected and you are unable to reach your usual health care provider, you should return to the Emergency Department. We are available 24 hours a day. Take this sheet with you when you go to your follow-up visit. If you have any problem arranging the follow-up visit, contact [address] [place] [address] 21 [phone])    Make an appointment with your Primary Care doctor for follow up of this visit. Return to the ER if you are unable to be seen in the time recommended on your discharge instructions. This note is prepared by Derrick Oates acting as scribe for Akbar Gutierrez. Dominick Gong MD : The scribe's documentation has been prepared under my direction and personally reviewed by me in its entirety. I confirm that the note above accurately reflects all work, treatment, procedures, and medical decision making performed by me.

## 2017-07-11 NOTE — ED NOTES
Dr. Fay Reza reviewed discharge instructions with the patient and spouse. The patient and spouse verbalized understanding. Ambulatory on discharge.

## 2017-07-12 LAB
ATRIAL RATE: 89 BPM
CALCULATED P AXIS, ECG09: 40 DEGREES
CALCULATED R AXIS, ECG10: 10 DEGREES
CALCULATED T AXIS, ECG11: 10 DEGREES
DIAGNOSIS, 93000: NORMAL
P-R INTERVAL, ECG05: 106 MS
Q-T INTERVAL, ECG07: 382 MS
QRS DURATION, ECG06: 106 MS
QTC CALCULATION (BEZET), ECG08: 464 MS
VENTRICULAR RATE, ECG03: 89 BPM

## 2017-07-21 ENCOUNTER — OFFICE VISIT (OUTPATIENT)
Dept: CARDIOLOGY CLINIC | Age: 62
End: 2017-07-21

## 2017-07-21 VITALS
OXYGEN SATURATION: 98 % | HEIGHT: 64 IN | RESPIRATION RATE: 16 BRPM | HEART RATE: 68 BPM | SYSTOLIC BLOOD PRESSURE: 134 MMHG | WEIGHT: 161.6 LBS | DIASTOLIC BLOOD PRESSURE: 72 MMHG | BODY MASS INDEX: 27.59 KG/M2

## 2017-07-21 DIAGNOSIS — R07.9 CHEST PAIN, UNSPECIFIED TYPE: ICD-10-CM

## 2017-07-21 DIAGNOSIS — R00.2 PALPITATIONS: Primary | ICD-10-CM

## 2017-07-21 RX ORDER — TRAZODONE HYDROCHLORIDE 50 MG/1
TABLET ORAL
COMMUNITY
End: 2018-10-11

## 2017-07-21 NOTE — PROGRESS NOTES
Patient is here to establish care after going to ER for palpitations and chest tightness. She has also had feelings of burning. She also has lump in throat.     Visit Vitals    /72 (BP 1 Location: Right arm, BP Patient Position: Sitting)    Pulse 68    Resp 16    Ht 5' 4\" (1.626 m)    Wt 161 lb 9.6 oz (73.3 kg)    SpO2 98%    BMI 27.74 kg/m2

## 2017-07-21 NOTE — MR AVS SNAPSHOT
Visit Information Date & Time Provider Department Dept. Phone Encounter #  
 7/21/2017 10:00 AM Robert Lawler MD [facility] [phone] [id number] Follow-up Instructions Return in about 2 weeks (around 8/4/2017). Follow-up and Disposition History Upcoming Health Maintenance Date Due Hepatitis C Screening 1955 Pneumococcal 19-64 Medium Risk (1 of 1 - PPSV23) 12/23/1973 DTaP/Tdap/Td series (1 - Tdap) 12/23/1975 PAP AKA CERVICAL CYTOLOGY 12/23/1975 FOBT Q 1 YEAR AGE 50-75 12/23/2004 ZOSTER VACCINE AGE 60> 10/23/2014 INFLUENZA AGE 9 TO ADULT 8/1/2017 BREAST CANCER SCRN MAMMOGRAM 4/21/2019 Allergies as of 7/21/2017  Review Complete On: 7/21/2017 By: Robert Lawler MD  
  
 Severity Noted Reaction Type Reactions Claritin [Loratadine]  12/13/2012    Other (comments) Night sweats Levaquin [Levofloxacin]  12/13/2012    Other (comments) Chest pain Other Medication  12/13/2012    Other (comments) Serovent inhalers Tetracycline  06/30/2017    Other (comments) Yeast infx Current Immunizations  Never Reviewed No immunizations on file. Not reviewed this visit You Were Diagnosed With   
  
 Codes Comments Palpitations    -  Primary ICD-10-CM: R00.2 ICD-9-CM: 785.1 Chest pain, unspecified type     ICD-10-CM: R07.9 ICD-9-CM: 786.50 Vitals BP Pulse Resp Height(growth percentile) Weight(growth percentile) SpO2  
 134/72 (BP 1 Location: Right arm, BP Patient Position: Sitting) 68 16 5' 4\" (1.626 m) 161 lb 9.6 oz (73.3 kg) 98% BMI OB Status Smoking Status 27.74 kg/m2 Postmenopausal Current Every Day Smoker BMI and BSA Data Body Mass Index Body Surface Area  
 27.74 kg/m 2 1.82 m 2 Preferred Pharmacy Pharmacy Name Phone [id number] [phone] [facility] [phone] Your Updated Medication List  
  
   
This list is accurate as of: 7/21/17 11:23 AM.  Always use your most recent med list. ADVIL 200 mg tablet Generic drug:  ibuprofen Take  by mouth. allergy injection  
  
 aspirin 81 mg chewable tablet Commonly known as:  ASPIRIN CHILDRENS Take 1 Tab by mouth daily. calcium carbonate 400 mg Chew Commonly known as:  MYLANTA Take 1 Tab by mouth three (3) times daily (with meals). LaMICtal 200 mg tablet Generic drug:  lamoTRIgine Take 50 mg by mouth daily. MAGNESIUM CITRATE PO Take  by mouth.  
  
 multivitamin tablet Commonly known as:  ONE A DAY Take 1 Tab by mouth daily. pantoprazole 40 mg tablet Commonly known as:  PROTONIX Take 1 Tab by mouth daily for 30 days. simvastatin 20 mg tablet Commonly known as:  ZOCOR Take  by mouth nightly. traZODone 50 mg tablet Commonly known as:  Gleda Christopher Take  by mouth nightly. We Performed the Following AMB POC EKG ROUTINE W/ 12 LEADS, INTER & REP [29556 CPT(R)] Follow-up Instructions Return in about 2 weeks (around 8/4/2017). To-Do List   
 07/21/2017 ECHO:  ECHO TTE STRESS EXRCSE COMP W OR WO CONTR Patient Instructions Schedule stress echocardiogram. 
 
Follow up with Dr. Marita Yates in 2-3 weeks. Introducing [facility]! Mary Thorpe introduces [employer] patient portal. Now you can access parts of your medical record, email your doctor's office, and request medication refills online. 1. In your internet browser, go to [email or web address] 2. Click on the First Time User? Click Here link in the Sign In box. You will see the New Member Sign Up page. 3. Enter your [employer] Access Code exactly as it appears below. You will not need to use this code after youve completed the sign-up process. If you do not sign up before the expiration date, you must request a new code. · [employer] Access Code: [email or web address] Expires: 10/5/2017  9:44 AM 
 
4. Enter the last four digits of your Social Security Number (xxxx) and Date of Birth (mm/dd/yyyy) as indicated and click Submit. You will be taken to the next sign-up page. 5. Create a [employer] ID. This will be your [employer] login ID and cannot be changed, so think of one that is secure and easy to remember. 6. Create a [employer] password. You can change your password at any time. 7. Enter your Password Reset Question and Answer. This can be used at a later time if you forget your password. 8. Enter your e-mail address. You will receive e-mail notification when new information is available in [address] 9. Click Sign Up. You can now view and download portions of your medical record. 10. Click the Download Summary menu link to download a portable copy of your medical information. If you have questions, please visit the Frequently Asked Questions section of the [employer] website. Remember, [employer] is NOT to be used for urgent needs. For medical emergencies, dial 911. Now available from your iPhone and Android! Please provide this summary of care documentation to your next provider. Your primary care clinician is listed as Lázaro Richards. If you have any questions after today's visit, please call [phone].

## 2017-07-21 NOTE — PROGRESS NOTES
HISTORY OF PRESENT ILLNESS  Rajani Tavarez is a 57 y.o. female. She has h/o HLP, mehul HTN ,severe anxiety,diverticulosis and kidney stones,  She also has h/o very borderline PMVP, and symptomatic palpitations,  here for cardiac follow up  STRESS ECHO ON 3/13 NO ISCHEMIA OR MI , mild HTN response to exercise and occasional PVCs  See in er on 7/17 for cp and palpitations  SH: quit smoking in 2007 but restarted, retired no significant etoh  FH: GM with mi ,60    Past Medical History:   Diagnosis Date    Depression     Hypercholesteremia     Hypercholesteremia     Hypertension      Past Surgical History:   Procedure Laterality Date    HX BUNIONECTOMY      HX TUBAL LIGATION      HX WISDOM TEETH EXTRACTION       Social History   Substance Use Topics    Smoking status: Current Every Day Smoker     Packs/day: 1.00     Last attempt to quit: 3/12/2008    Smokeless tobacco: Never Used    Alcohol use Yes      Comment: 2 glasses of wine a day       HPI  In 6/17 developed bronchitis and had steroids and abx  Since then she has had occasional chest and throat tightness with activities which has now changed to occasional chest burning with and without activities. Also palpitations mostly at night for the last 2 weeks worse with trazodone  Review of Systems   Respiratory: Negative. Cardiovascular: Positive for chest pain and palpitations. Negative for orthopnea, claudication, leg swelling and PND. Physical Exam   Physical Exam   Blood pressure 134/72, pulse 68, resp. rate 16, height 5' 4\" (1.626 m), weight 73.3 kg (161 lb 9.6 oz), SpO2 98 %. Constitutional: She is oriented to person, place, and time. She appears well-developed and well-nourished. No distress. HENT: Head: Normocephalic. Eyes: No scleral icterus. Neck: Normal range of motion. Neck supple. No JVD present. No tracheal deviation present. Cardiovascular: Normal rate, regular rhythm, normal heart sounds and intact distal pulses.   Exam reveals no gallop and no friction rub. No murmur heard. Pulmonary/Chest: Effort normal and breath sounds normal. No stridor. No respiratory distress, wheezes or  rales. Abdominal: She exhibits no distension. Musculoskeletal: She exhibits no edema. Neurological: She is alert and oriented to person, place, and time. Coordination normal.   Skin: Skin is warm. No rash noted. Not diaphoretic. No erythema. Psychiatric:  Normal mood and affect. Behavior is normal.   Current Outpatient Prescriptions on File Prior to Visit   Medication Sig Dispense Refill    pantoprazole (PROTONIX) 40 mg tablet Take 1 Tab by mouth daily for 30 days. 30 Tab 0    lamoTRIgine (LAMICTAL) 200 mg tablet Take 50 mg by mouth daily.  simvastatin (ZOCOR) 20 mg tablet Take  by mouth nightly.  multivitamin (ONE A DAY) tablet Take 1 Tab by mouth daily.  aspirin (ASPIRIN CHILDRENS) 81 mg chewable tablet Take 1 Tab by mouth daily. 100 Tab 0    ibuprofen (ADVIL) 200 mg tablet Take  by mouth.  allergy injection        No current facility-administered medications on file prior to visit. No results found for: TSH, TSH2, TSH3, TSHP, TSHEXT  Lab Results   Component Value Date/Time    Sodium 143 07/11/2017 05:14 PM    Potassium 4.2 07/11/2017 05:14 PM    Chloride 110 07/11/2017 05:14 PM    CO2 25 07/11/2017 05:14 PM    Anion gap 8 07/11/2017 05:14 PM    Glucose 119 07/11/2017 05:14 PM    BUN 16 07/11/2017 05:14 PM    Creatinine 0.76 07/11/2017 05:14 PM    BUN/Creatinine ratio 21 07/11/2017 05:14 PM    GFR est AA >60 07/11/2017 05:14 PM    GFR est non-AA >60 07/11/2017 05:14 PM    Calcium 9.3 07/11/2017 05:14 PM    Bilirubin, total 0.4 07/11/2017 05:14 PM    AST (SGOT) 14 07/11/2017 05:14 PM    Alk.  phosphatase 101 07/11/2017 05:14 PM    Protein, total 7.0 07/11/2017 05:14 PM    Albumin 3.8 07/11/2017 05:14 PM    Globulin 3.2 07/11/2017 05:14 PM    A-G Ratio 1.2 07/11/2017 05:14 PM    ALT (SGPT) 29 07/11/2017 05:14 PM     Lab Results Component Value Date/Time    WBC 8.0 07/11/2017 05:14 PM    HGB 14.7 07/11/2017 05:14 PM    HCT 43.9 07/11/2017 05:14 PM    PLATELET 346 28/25/3312 05:14 PM    MCV 86.9 07/11/2017 05:14 PM       ASSESSMENT and PLAN  CP: in some ways atypical but she has risk factors for cad and needs further evaluation. Her ECG shows NSR short pr and RBBB , no significant st t changes. Proceed with stress echocardiogram  Palpitations: short DE on ecg need also evaluation, holter monitor ongoing  HTN: well controlled  HLD: closely followed by her pcp  Abnormal ECG: discussed RBBB ? copd, no h/o darcie , tobacco cessation stressed  See her after tests

## 2017-07-26 ENCOUNTER — CLINICAL SUPPORT (OUTPATIENT)
Dept: CARDIOLOGY CLINIC | Age: 62
End: 2017-07-26

## 2017-07-26 DIAGNOSIS — R07.9 CHEST PAIN, UNSPECIFIED TYPE: ICD-10-CM

## 2017-07-26 DIAGNOSIS — R00.2 PALPITATIONS: ICD-10-CM

## 2017-08-02 ENCOUNTER — OFFICE VISIT (OUTPATIENT)
Dept: CARDIOLOGY CLINIC | Age: 62
End: 2017-08-02

## 2017-08-02 VITALS
HEART RATE: 68 BPM | WEIGHT: 160 LBS | DIASTOLIC BLOOD PRESSURE: 70 MMHG | BODY MASS INDEX: 27.31 KG/M2 | OXYGEN SATURATION: 98 % | SYSTOLIC BLOOD PRESSURE: 112 MMHG | HEIGHT: 64 IN

## 2017-08-02 DIAGNOSIS — R00.2 PALPITATIONS: Primary | ICD-10-CM

## 2017-08-02 DIAGNOSIS — R07.9 CHEST PAIN, UNSPECIFIED TYPE: ICD-10-CM

## 2017-08-02 RX ORDER — PANTOPRAZOLE SODIUM 40 MG/1
40 TABLET, DELAYED RELEASE ORAL DAILY
COMMUNITY
End: 2018-10-11

## 2017-08-02 NOTE — MR AVS SNAPSHOT
Visit Information Date & Time Provider Department Dept. Phone Encounter #  
 8/2/2017 10:20 AM Sakina Mendez MD [facility] [place] [phone] [id number] Follow-up Instructions Return in about 2 weeks (around 8/16/2017). Follow-up and Disposition History Upcoming Health Maintenance Date Due Hepatitis C Screening 1955 Pneumococcal 19-64 Medium Risk (1 of 1 - PPSV23) 12/23/1973 DTaP/Tdap/Td series (1 - Tdap) 12/23/1975 PAP AKA CERVICAL CYTOLOGY 12/23/1975 FOBT Q 1 YEAR AGE 50-75 12/23/2004 ZOSTER VACCINE AGE 60> 10/23/2014 INFLUENZA AGE 9 TO ADULT 8/1/2017 BREAST CANCER SCRN MAMMOGRAM 4/21/2019 Allergies as of 8/2/2017  Review Complete On: 8/2/2017 By: Sakina Mendez MD  
  
 Severity Noted Reaction Type Reactions Claritin [Loratadine]  12/13/2012    Other (comments) Night sweats Levaquin [Levofloxacin]  12/13/2012    Other (comments) Chest pain Other Medication  12/13/2012    Other (comments) Serovent inhalers Tetracycline  06/30/2017    Other (comments) Yeast infx Current Immunizations  Never Reviewed No immunizations on file. Not reviewed this visit You Were Diagnosed With   
  
 Codes Comments Palpitations    -  Primary ICD-10-CM: R00.2 ICD-9-CM: 785.1 Chest pain, unspecified type     ICD-10-CM: R07.9 ICD-9-CM: 786.50 Vitals BP Pulse Height(growth percentile) Weight(growth percentile) SpO2 BMI  
 112/70 (BP 1 Location: Right arm, BP Patient Position: Sitting) 68 5' 4\" (1.626 m) 160 lb (72.6 kg) 98% 27.46 kg/m2 OB Status Smoking Status Postmenopausal Former Smoker Vitals History BMI and BSA Data Body Mass Index Body Surface Area  
 27.46 kg/m 2 1.81 m 2 Preferred Pharmacy Pharmacy Name Phone [phone] [facility] [phone] Your Updated Medication List  
  
   
This list is accurate as of: 8/2/17 11:00 AM.  Always use your most recent med list. ADVIL 200 mg tablet Generic drug:  ibuprofen Take  by mouth. allergy injection  
  
 calcium carbonate 400 mg Chew Commonly known as:  MYLANTA Take 1 Tab by mouth three (3) times daily (with meals). LaMICtal 200 mg tablet Generic drug:  lamoTRIgine Take 50 mg by mouth daily. MAGNESIUM CITRATE PO Take  by mouth.  
  
 multivitamin tablet Commonly known as:  ONE A DAY Take 1 Tab by mouth daily. PROTONIX 40 mg tablet Generic drug:  pantoprazole Take 40 mg by mouth daily. simvastatin 20 mg tablet Commonly known as:  ZOCOR Take  by mouth nightly. traZODone 50 mg tablet Commonly known as:  Rosi Runner Take  by mouth nightly. Follow-up Instructions Return in about 2 weeks (around 8/16/2017). Patient Instructions Nuclear stress test and per [facility] he will see the patient on the same day after test 
 
 
  
Introducing [facility] & HEALTH SERVICES! [employer] introduces [employer] patient portal. Now you can access parts of your medical record, email your doctor's office, and request medication refills online. 1. In your internet browser, go to [email or web address] 2. Click on the First Time User? Click Here link in the Sign In box. You will see the New Member Sign Up page. 3. Enter your [employer] Access Code exactly as it appears below. You will not need to use this code after youve completed the sign-up process. If you do not sign up before the expiration date, you must request a new code. · [employer] Access Code: [email or web address] Expires: 10/5/2017  9:44 AM 
 
4. Enter the last four digits of your Social Security Number (xxxx) and Date of Birth (mm/dd/yyyy) as indicated and click Submit. You will be taken to the next sign-up page. 5. Create a [employer] ID. This will be your [employer] login ID and cannot be changed, so think of one that is secure and easy to remember. 6. Create a [employer] password. You can change your password at any time. 7. Enter your Password Reset Question and Answer. This can be used at a later time if you forget your password. 8. Enter your e-mail address. You will receive e-mail notification when new information is available in [address] 9. Click Sign Up. You can now view and download portions of your medical record. 10. Click the Download Summary menu link to download a portable copy of your medical information. If you have questions, please visit the Frequently Asked Questions section of the [employer] website. Remember, [employer] is NOT to be used for urgent needs. For medical emergencies, dial 911. Now available from your iPhone and Android! Please provide this summary of care documentation to your next provider. Your primary care clinician is listed as Kendra Briceno. If you have any questions after today's visit, please call [phone].

## 2017-08-02 NOTE — PROGRESS NOTES
HISTORY OF PRESENT ILLNESS  Jenni Freeman is a 57 y.o. female. She has h/o HLP, mehul HTN ,severe anxiety,diverticulosis and kidney stones,  She also has h/o very borderline PMVP, and symptomatic palpitations,  here for cardiac follow up  STRESS ECHO ON 3/13 NO ISCHEMIA OR MI , mild HTN response to exercise and occasional PVCs  See in er on 7/17 for cp and palpitations  Stress echo on 7/17:ECG conclusions: The stress ECG was negative for ischemia. Baseline: Although no diagnostic regional wall motion abnormality was  identified, this possibility cannot be completely excluded on the basis of  this study. Estimated left ventricular ejection fraction was 55 % . Peak stress: Although no diagnostic regional wall motion abnormality was  identified, this possibility cannot be completely excluded on the basis of  this study. LV size was unchanged from the previous stage. There was no  change in LV function since the previous stage. Impressions and recommendations: Equivocal study after maximal exercise  without reproduction of symptoms. SH: quit smoking in 2007 but restarted, retired no significant etoh  FH: GM with mi ,60          Past Medical History:   Diagnosis Date    Depression      Hypercholesteremia      Hypercholesteremia      Hypertension        Past Surgical History:   Procedure Laterality Date    HX BUNIONECTOMY        HX TUBAL LIGATION        HX WISDOM TEETH EXTRACTION                  Social History   Substance Use Topics    Smoking status: Current Every Day Smoker       Packs/day: 1.00       Last attempt to quit: 3/12/2008    Smokeless tobacco: Never Used    Alcohol use Yes          Comment: 2 glasses of wine a day        HPI  Sob walking across parking lot today   Occasional chest burning radiating to neck which she associates to gerd  Still with palpitations  Review of Systems   Respiratory: Positive for shortness of breath. Cardiovascular: Positive for chest pain and palpitations. Physical Exam  Visit Vitals    /70 (BP 1 Location: Right arm, BP Patient Position: Sitting)    Pulse 68    Ht 5' 4\" (1.626 m)    Wt 72.6 kg (160 lb)    SpO2 98%    BMI 27.46 kg/m2       ASSESSMENT and PLAN  CP: occasional chest burning and one episode of sob today walking across parking lot. Unfortunately stress echo equivocal! Discussed this at length with patient. Options also reviewed. Given symptoms and risk factors I feel that cardiac catheterization could be a possibility to this effect risks and benefits were explained. Also nuclear stress test a feasible option . Patient prefers to proceed with stress tets again first.proceed with nuclear stress test  Palpitations: short SC on ecg needing also evaluation, holter monitor ongoing and results not yet available  HTN: well controlled  HLD: closely followed by her pcp  Abnormal ECG: discussed RBBB ? copd, no h/o darcie , tobacco cessation stressed  See her after nuclear stress test

## 2017-08-21 ENCOUNTER — TELEPHONE (OUTPATIENT)
Dept: CARDIOLOGY CLINIC | Age: 62
End: 2017-08-21

## 2017-08-21 NOTE — TELEPHONE ENCOUNTER
Verified patient with two patient identifiers. Spoke with patient's ,he said they would like to come in for office visit to discuss cath.procedure. Appointment given on August 28,2017 @ 2 PM

## 2017-08-21 NOTE — TELEPHONE ENCOUNTER
Pt's  stated pt's nuclear was denied by insurance so he would like to know what the next step is going to be. Pt's  can be reached at [phone].     Thank you,  Sergei Giordano

## 2017-08-28 ENCOUNTER — OFFICE VISIT (OUTPATIENT)
Dept: CARDIOLOGY CLINIC | Age: 62
End: 2017-08-28

## 2017-08-28 VITALS
OXYGEN SATURATION: 99 % | HEIGHT: 64 IN | SYSTOLIC BLOOD PRESSURE: 100 MMHG | WEIGHT: 163 LBS | BODY MASS INDEX: 27.83 KG/M2 | HEART RATE: 73 BPM | DIASTOLIC BLOOD PRESSURE: 60 MMHG

## 2017-08-28 DIAGNOSIS — Z72.0 TOBACCO ABUSE: ICD-10-CM

## 2017-08-28 DIAGNOSIS — R00.2 PALPITATIONS: Primary | ICD-10-CM

## 2017-08-28 DIAGNOSIS — R07.9 CHEST PAIN, UNSPECIFIED TYPE: ICD-10-CM

## 2017-08-28 NOTE — PROGRESS NOTES
HISTORY OF PRESENT ILLNESS  Tahmina Toledo is a 57 y.o. female. She has h/o HLP, mehul HTN ,severe anxiety,diverticulosis and kidney stones,  She also has h/o very borderline PMVP, and symptomatic palpitations,  here for cardiac follow up  STRESS ECHO ON 3/13 NO ISCHEMIA OR MI , mild HTN response to exercise and occasional PVCs  See in er on 7/17 for cp and palpitations  Stress echo on 7/17:ECG conclusions: The stress ECG was negative for ischemia. Baseline: Although no diagnostic regional wall motion abnormality was  identified, this possibility cannot be completely excluded on the basis of  this study. Estimated left ventricular ejection fraction was 55 % . Peak stress: Although no diagnostic regional wall motion abnormality was  identified, this possibility cannot be completely excluded on the basis of  this study. LV size was unchanged from the previous stage. There was no  change in LV function since the previous stage. Impressions and recommendations: Equivocal study after maximal exercise  without reproduction of symptoms.   HOLTER MONITOR on 7/31/17 at [facility] showed NSR  rare pacs and pvcs  SH: quit smoking in 2007 but restarted, retired no significant etoh  FH: GM with mi ,60              Past Medical History:   Diagnosis Date    Depression       Hypercholesteremia       Hypercholesteremia       Hypertension                Past Surgical History:   Procedure Laterality Date    HX BUNIONECTOMY          HX TUBAL LIGATION          HX WISDOM TEETH EXTRACTION                           Social History   Substance Use Topics    Smoking status: Current Every Day Smoker         Packs/day: 1.00         Last attempt to quit: 3/12/2008    Smokeless tobacco: Never Used    Alcohol use Yes              Comment: 2 glasses of wine a day       HPI  Sob better with inhalers, chest burning better with gaviscon  Still lots of palpitations daily  Review of Systems   Respiratory: Positive for shortness of breath. Cardiovascular: Positive for chest pain and palpitations. Physical Exam  Visit Vitals    /60 (BP 1 Location: Left arm, BP Patient Position: Sitting)    Pulse 73    Ht 5' 4\" (1.626 m)    Wt 73.9 kg (163 lb)    SpO2 99%    BMI 27.98 kg/m2       ASSESSMENT and PLAN  CP: still unclear etiology of her chest burning. Her insurance has declined repeating stress test as a nuclear stress test. Cardiac catheterization has been discussed. In some ways these symptoms seem better with gi meds and sob much better with inhalers she tells me. discussed again equivocal stress echo. Different options discussed. Hold off cardiac catheterization until gi evaluation has been completed, if this is normal then I feel that cardiac catheterization should be considered  Patient is at an acceptable cardiac risk to proceed with endoscopy  We have also discussed ca score its implications and significance , I feel that this may be helpful in deciding how to approach her symptoms as well  Palpitations: short [place] on ecg needing also evaluation, holter monitor to be repeated , more palpitations now and not very revealing the first one, significance of rbbb also discussed  caffeine cessation also stressed  HTN: well controlled  HLD: closely followed by her pcp  Abnormal ECG: discussed RBBB ? copd, no h/o darcie , tobacco cessation stressed!   See her after gi evaluation

## 2017-08-28 NOTE — MR AVS SNAPSHOT
Visit Information Date & Time Provider Department Dept. Phone Encounter #  
 8/28/2017  2:00 PM Gale Florence MD [facility] [place] [phone] [id number] Follow-up Instructions Return in about 4 weeks (around 9/25/2017). Follow-up and Disposition History Upcoming Health Maintenance Date Due Hepatitis C Screening 1955 DTaP/Tdap/Td series (1 - Tdap) 12/23/1975 PAP AKA CERVICAL CYTOLOGY 12/23/1975 FOBT Q 1 YEAR AGE 50-75 12/23/2004 ZOSTER VACCINE AGE 60> 10/23/2014 INFLUENZA AGE 9 TO ADULT 8/1/2017 BREAST CANCER SCRN MAMMOGRAM 4/21/2019 Allergies as of 8/28/2017  Review Complete On: 8/28/2017 By: Gale Florence MD  
  
 Severity Noted Reaction Type Reactions Claritin [Loratadine]  12/13/2012    Other (comments) Night sweats Levaquin [Levofloxacin]  12/13/2012    Other (comments) Chest pain Other Medication  12/13/2012    Other (comments) Serovent inhalers Tetracycline  06/30/2017    Other (comments) Yeast infx Current Immunizations  Never Reviewed No immunizations on file. Not reviewed this visit You Were Diagnosed With   
  
 Codes Comments Palpitations    -  Primary ICD-10-CM: R00.2 ICD-9-CM: 785.1 Chest pain, unspecified type     ICD-10-CM: R07.9 ICD-9-CM: 786.50 Tobacco abuse     ICD-10-CM: Z72.0 ICD-9-CM: 305.1 Vitals BP Pulse Height(growth percentile) Weight(growth percentile) SpO2 BMI  
 100/60 (BP 1 Location: Left arm, BP Patient Position: Sitting) 73 5' 4\" (1.626 m) 163 lb (73.9 kg) 99% 27.98 kg/m2 OB Status Smoking Status Postmenopausal Former Smoker Vitals History BMI and BSA Data Body Mass Index Body Surface Area  
 27.98 kg/m 2 1.83 m 2 Preferred Pharmacy Pharmacy Name Phone [phone] [facility] [phone] Your Updated Medication List  
  
   
This list is accurate as of: 8/28/17  3:24 PM.  Always use your most recent med list. ADVIL 200 mg tablet Generic drug:  ibuprofen Take  by mouth. allergy injection  
  
 calcium carbonate 400 mg Chew Commonly known as:  MYLANTA Take 1 Tab by mouth three (3) times daily (with meals). CALCIUM CITRATE + PO Take 400 mg by mouth. LaMICtal 200 mg tablet Generic drug:  lamoTRIgine Take 50 mg by mouth daily. MAGNESIUM CITRATE PO Take  by mouth.  
  
 multivitamin tablet Commonly known as:  ONE A DAY Take 1 Tab by mouth daily. PROTONIX 40 mg tablet Generic drug:  pantoprazole Take 40 mg by mouth daily. simvastatin 20 mg tablet Commonly known as:  ZOCOR Take  by mouth nightly. traZODone 50 mg tablet Commonly known as:  Rhonda Macadamia Take  by mouth nightly. TRINTELLIX 10 mg tablet Generic drug:  vortioxetine Take  by mouth daily. Follow-up Instructions Return in about 4 weeks (around 9/25/2017). Patient Instructions Calcium score and 24 hour monitor Introducing [facility]! [employer] introduces [employer] patient portal. Now you can access parts of your medical record, email your doctor's office, and request medication refills online. 1. In your internet browser, go to [email or web address] 2. Click on the First Time User? Click Here link in the Sign In box. You will see the New Member Sign Up page. 3. Enter your Desk[employer] Access Code exactly as it appears below. You will not need to use this code after youve completed the sign-up process. If you do not sign up before the expiration date, you must request a new code. · [employer] Access Code: [email or web address] Expires: 10/5/2017  9:44 AM 
 
4. Enter the last four digits of your Social Security Number (xxxx) and Date of Birth (mm/dd/yyyy) as indicated and click Submit.  You will be taken to the next sign-up page. 5. Create a [employer] ID. This will be your [employer] login ID and cannot be changed, so think of one that is secure and easy to remember. 6. Create a [employer] password. You can change your password at any time. 7. Enter your Password Reset Question and Answer. This can be used at a later time if you forget your password. 8. Enter your e-mail address. You will receive e-mail notification when new information is available in [address] 9. Click Sign Up. You can now view and download portions of your medical record. 10. Click the Download Summary menu link to download a portable copy of your medical information. If you have questions, please visit the Frequently Asked Questions section of the [employer] website. Remember, [employer] is NOT to be used for urgent needs. For medical emergencies, dial 911. Now available from your iPhone and Android! Please provide this summary of care documentation to your next provider. Your primary care clinician is listed as Myron Riddle. If you have any questions after today's visit, please call [phone].

## 2017-08-31 ENCOUNTER — CLINICAL SUPPORT (OUTPATIENT)
Dept: CARDIOLOGY CLINIC | Age: 62
End: 2017-08-31

## 2017-08-31 ENCOUNTER — HOSPITAL ENCOUNTER (OUTPATIENT)
Dept: CT IMAGING | Age: 62
Discharge: HOME | End: 2017-08-31
Payer: SELF-PAY

## 2017-08-31 DIAGNOSIS — R00.2 PALPITATIONS: Primary | ICD-10-CM

## 2017-08-31 DIAGNOSIS — Z00.00 PREVENTATIVE HEALTH CARE: ICD-10-CM

## 2017-08-31 PROCEDURE — 75571 CT HRT W/O DYE W/CA TEST: CPT

## 2017-09-05 ENCOUNTER — TELEPHONE (OUTPATIENT)
Dept: CARDIOLOGY CLINIC | Age: 62
End: 2017-09-05

## 2017-09-05 NOTE — TELEPHONE ENCOUNTER
Verified patient with two patient identifiers. Spoke with patient's ,Calcium score result given. Told him also that I do not have the results of her holter yet. Per  calcium score showed minimal calcium,proceed with endoscopy and hold cath. for now.

## 2017-09-11 ENCOUNTER — TELEPHONE (OUTPATIENT)
Dept: CARDIOLOGY CLINIC | Age: 62
End: 2017-09-11

## 2017-09-11 NOTE — TELEPHONE ENCOUNTER
Verified patient with two patient identifiers. Spoke with patient's  holter monitor results given. Copy of her results also mailed to patient per 's request

## 2018-05-02 ENCOUNTER — HOSPITAL ENCOUNTER (OUTPATIENT)
Dept: MAMMOGRAPHY | Age: 63
Discharge: HOME | End: 2018-05-02
Attending: SPECIALIST
Payer: COMMERCIAL

## 2018-05-02 DIAGNOSIS — Z12.31 VISIT FOR SCREENING MAMMOGRAM: ICD-10-CM

## 2018-05-02 PROCEDURE — 77067 SCR MAMMO BI INCL CAD: CPT

## 2018-10-11 ENCOUNTER — APPOINTMENT (OUTPATIENT)
Dept: CT IMAGING | Age: 63
End: 2018-10-11
Payer: COMMERCIAL

## 2018-10-11 ENCOUNTER — HOSPITAL ENCOUNTER (EMERGENCY)
Age: 63
Discharge: HOME | End: 2018-10-11
Attending: EMERGENCY MEDICINE
Payer: COMMERCIAL

## 2018-10-11 VITALS
DIASTOLIC BLOOD PRESSURE: 75 MMHG | HEIGHT: 64 IN | HEART RATE: 85 BPM | WEIGHT: 170.64 LBS | BODY MASS INDEX: 29.13 KG/M2 | OXYGEN SATURATION: 100 % | SYSTOLIC BLOOD PRESSURE: 140 MMHG | RESPIRATION RATE: 16 BRPM | TEMPERATURE: 98 F

## 2018-10-11 DIAGNOSIS — R10.32 ABDOMINAL PAIN, LLQ (LEFT LOWER QUADRANT): Primary | ICD-10-CM

## 2018-10-11 LAB
ALBUMIN SERPL-MCNC: 3.9 G/DL (ref 3.5–5)
ALBUMIN/GLOB SERPL: 1.1 {RATIO} (ref 1.1–2.2)
ALP SERPL-CCNC: 110 U/L (ref 45–117)
ALT SERPL-CCNC: 22 U/L (ref 12–78)
ANION GAP SERPL CALC-SCNC: 4 MMOL/L (ref 5–15)
APPEARANCE UR: CLEAR
AST SERPL-CCNC: 24 U/L (ref 15–37)
BACTERIA URNS QL MICRO: NEGATIVE /HPF
BASOPHILS # BLD: 0 K/UL (ref 0–0.1)
BASOPHILS NFR BLD: 1 % (ref 0–1)
BILIRUB SERPL-MCNC: 0.4 MG/DL (ref 0.2–1)
BILIRUB UR QL: NEGATIVE
BUN SERPL-MCNC: 12 MG/DL (ref 6–20)
BUN/CREAT SERPL: 19 (ref 12–20)
CALCIUM SERPL-MCNC: 9.2 MG/DL (ref 8.5–10.1)
CHLORIDE SERPL-SCNC: 108 MMOL/L (ref 97–108)
CO2 SERPL-SCNC: 29 MMOL/L (ref 21–32)
COLOR UR: ABNORMAL
CREAT SERPL-MCNC: 0.63 MG/DL (ref 0.55–1.02)
DIFFERENTIAL METHOD BLD: ABNORMAL
EOSINOPHIL # BLD: 0.1 K/UL (ref 0–0.4)
EOSINOPHIL NFR BLD: 1 % (ref 0–7)
EPITH CASTS URNS QL MICRO: ABNORMAL /LPF
ERYTHROCYTE [DISTWIDTH] IN BLOOD BY AUTOMATED COUNT: 14.4 % (ref 11.5–14.5)
GLOBULIN SER CALC-MCNC: 3.6 G/DL (ref 2–4)
GLUCOSE SERPL-MCNC: 108 MG/DL (ref 65–100)
GLUCOSE UR STRIP.AUTO-MCNC: NEGATIVE MG/DL
HCT VFR BLD AUTO: 44.4 % (ref 35–47)
HGB BLD-MCNC: 14.3 G/DL (ref 11.5–16)
HGB UR QL STRIP: ABNORMAL
HYALINE CASTS URNS QL MICRO: ABNORMAL /LPF (ref 0–5)
IMM GRANULOCYTES # BLD: 0 K/UL (ref 0–0.04)
IMM GRANULOCYTES NFR BLD AUTO: 1 % (ref 0–0.5)
KETONES UR QL STRIP.AUTO: NEGATIVE MG/DL
LEUKOCYTE ESTERASE UR QL STRIP.AUTO: NEGATIVE
LYMPHOCYTES # BLD: 1.4 K/UL (ref 0.8–3.5)
LYMPHOCYTES NFR BLD: 24 % (ref 12–49)
MCH RBC QN AUTO: 29 PG (ref 26–34)
MCHC RBC AUTO-ENTMCNC: 32.2 G/DL (ref 30–36.5)
MCV RBC AUTO: 90.1 FL (ref 80–99)
MONOCYTES # BLD: 0.5 K/UL (ref 0–1)
MONOCYTES NFR BLD: 8 % (ref 5–13)
NEUTS SEG # BLD: 3.7 K/UL (ref 1.8–8)
NEUTS SEG NFR BLD: 66 % (ref 32–75)
NITRITE UR QL STRIP.AUTO: NEGATIVE
NRBC # BLD: 0 K/UL (ref 0–0.01)
NRBC BLD-RTO: 0 PER 100 WBC
PH UR STRIP: 6.5 [PH] (ref 5–8)
PLATELET # BLD AUTO: 231 K/UL (ref 150–400)
PMV BLD AUTO: 10.4 FL (ref 8.9–12.9)
POTASSIUM SERPL-SCNC: 4.3 MMOL/L (ref 3.5–5.1)
PROT SERPL-MCNC: 7.5 G/DL (ref 6.4–8.2)
PROT UR STRIP-MCNC: NEGATIVE MG/DL
RBC # BLD AUTO: 4.93 M/UL (ref 3.8–5.2)
RBC #/AREA URNS HPF: ABNORMAL /HPF (ref 0–5)
SODIUM SERPL-SCNC: 141 MMOL/L (ref 136–145)
SP GR UR REFRACTOMETRY: <1.005 (ref 1–1.03)
UA: UC IF INDICATED,UAUC: ABNORMAL
UROBILINOGEN UR QL STRIP.AUTO: 0.2 EU/DL (ref 0.2–1)
WBC # BLD AUTO: 5.6 K/UL (ref 3.6–11)
WBC URNS QL MICRO: ABNORMAL /HPF (ref 0–4)

## 2018-10-11 PROCEDURE — 80053 COMPREHEN METABOLIC PANEL: CPT | Performed by: EMERGENCY MEDICINE

## 2018-10-11 PROCEDURE — 81001 URINALYSIS AUTO W/SCOPE: CPT | Performed by: EMERGENCY MEDICINE

## 2018-10-11 PROCEDURE — 99283 EMERGENCY DEPT VISIT LOW MDM: CPT

## 2018-10-11 PROCEDURE — 85025 COMPLETE CBC W/AUTO DIFF WBC: CPT | Performed by: EMERGENCY MEDICINE

## 2018-10-11 PROCEDURE — 36415 COLL VENOUS BLD VENIPUNCTURE: CPT | Performed by: EMERGENCY MEDICINE

## 2018-10-11 PROCEDURE — 74011636320 HC RX REV CODE- 636/320: Performed by: EMERGENCY MEDICINE

## 2018-10-11 PROCEDURE — 74011250636 HC RX REV CODE- 250/636: Performed by: EMERGENCY MEDICINE

## 2018-10-11 PROCEDURE — 74177 CT ABD & PELVIS W/CONTRAST: CPT

## 2018-10-11 RX ORDER — LOPERAMIDE HCL 2 MG
2 TABLET ORAL
Qty: 20 TAB | Refills: 0 | Status: SHIPPED | OUTPATIENT
Start: 2018-10-11 | End: 2018-10-11

## 2018-10-11 RX ORDER — DESVENLAFAXINE 100 MG/1
100 TABLET, EXTENDED RELEASE ORAL
COMMUNITY

## 2018-10-11 RX ORDER — ONDANSETRON 4 MG/1
4 TABLET, FILM COATED ORAL
Qty: 20 TAB | Refills: 0 | Status: SHIPPED | OUTPATIENT
Start: 2018-10-11 | End: 2021-08-05 | Stop reason: CLARIF

## 2018-10-11 RX ORDER — SODIUM CHLORIDE 0.9 % (FLUSH) 0.9 %
5-10 SYRINGE (ML) INJECTION EVERY 8 HOURS
Status: DISCONTINUED | OUTPATIENT
Start: 2018-10-11 | End: 2018-10-11 | Stop reason: HOSPADM

## 2018-10-11 RX ORDER — SODIUM CHLORIDE 0.9 % (FLUSH) 0.9 %
10 SYRINGE (ML) INJECTION
Status: COMPLETED | OUTPATIENT
Start: 2018-10-11 | End: 2018-10-11

## 2018-10-11 RX ORDER — GABAPENTIN 300 MG/1
600 CAPSULE ORAL
COMMUNITY

## 2018-10-11 RX ORDER — FAMOTIDINE 40 MG/1
40 TABLET, FILM COATED ORAL DAILY
COMMUNITY
End: 2021-10-04 | Stop reason: ALTCHOICE

## 2018-10-11 RX ORDER — IBUPROFEN 600 MG/1
600 TABLET ORAL
Qty: 30 TAB | Refills: 0 | Status: SHIPPED | OUTPATIENT
Start: 2018-10-11 | End: 2021-10-04 | Stop reason: ALTCHOICE

## 2018-10-11 RX ORDER — SODIUM CHLORIDE 0.9 % (FLUSH) 0.9 %
5-10 SYRINGE (ML) INJECTION AS NEEDED
Status: DISCONTINUED | OUTPATIENT
Start: 2018-10-11 | End: 2018-10-11 | Stop reason: HOSPADM

## 2018-10-11 RX ORDER — LEVALBUTEROL TARTRATE 45 UG/1
2 AEROSOL, METERED ORAL
COMMUNITY

## 2018-10-11 RX ORDER — DICYCLOMINE HYDROCHLORIDE 10 MG/1
10 CAPSULE ORAL 4 TIMES DAILY
Qty: 20 CAP | Refills: 0 | Status: SHIPPED | OUTPATIENT
Start: 2018-10-11 | End: 2018-10-16

## 2018-10-11 RX ORDER — SODIUM CHLORIDE 9 MG/ML
50 INJECTION, SOLUTION INTRAVENOUS
Status: COMPLETED | OUTPATIENT
Start: 2018-10-11 | End: 2018-10-11

## 2018-10-11 RX ORDER — LORAZEPAM 0.5 MG/1
0.5 TABLET ORAL AS NEEDED
COMMUNITY

## 2018-10-11 RX ADMIN — IOPAMIDOL 100 ML: 755 INJECTION, SOLUTION INTRAVENOUS at 13:36

## 2018-10-11 RX ADMIN — SODIUM CHLORIDE 50 ML/HR: 900 INJECTION, SOLUTION INTRAVENOUS at 13:36

## 2018-10-11 RX ADMIN — Medication 10 ML: at 13:36

## 2018-10-11 NOTE — ED PROVIDER NOTES
12:43 PM 
KATHLEEN Hancock PA-C has evaluated the patient in J[facility]. Pt presents to the ED with c/o LLQ pain x 5 days. Pt denied fever. No N/V/D. No BRBPR/melena. Has a h/o kidney stones but states this pain is dissimilar. They have reviewed the vital signs and the triage nurse assessment. They have talked with the patient and any available family and advised that the appropriate studies have been ordered to initiate the work up based on the clinical presentation during the assessment. The pt has been advised that they will be accommodated in the Main ED as soon as possible. The pt has been requested to contact the triage nurse or PIT immediately if they experiences any changes in their condition during this brief waiting period. EMERGENCY DEPARTMENT HISTORY AND PHYSICAL EXAM 
 
 
Date: 10/11/2018 Patient Name: Bonifacio Dorantes History of Presenting Illness Chief Complaint Patient presents with  Abdominal Pain Pt reports pain to LLQ x 5 days. Denies N/v/d. History Provided By: Patient HPI: Bonifacio Dorantes, 60 y.o. female with PMHx significant for HTN, hypercholesteremia, presents ambulatory to the ED with cc of progressively worsening, constant LLQ pain x 5 days. The pt reports her pain is exacerbated with movement. She adds that her symptoms are relieved when she lays down, with severity 1/10. The pt notes that she has a hx of diverticulosis and kidney stones. She specifically denies any N/V/D, fevers, and constipation. There are no other complaints, changes, or physical findings at this time. PCP: Miles Rosenthal MD 
 
Current Facility-Administered Medications Medication Dose Route Frequency Provider Last Rate Last Dose  sodium chloride (NS) flush 5-10 mL  5-10 mL IntraVENous [address], [place]      
 sodium chloride (NS) flush 5-10 mL  5-10 mL IntraVENous PRN [facility] [place] Current Outpatient Prescriptions Medication Sig Dispense Refill  levalbuterol tartrate (XOPENEX HFA) 45 mcg/actuation inhaler Take 2 Puffs by inhalation every six (6) hours as needed for Wheezing.  Desvenlafaxine (PRISTIQ) 100 mg Tb24 Take 100 mg by mouth.  omega 3-dha-epa-fish oil (FISH OIL) 100-160-1,000 mg cap Take 1 Tab by mouth daily.  famotidine (PEPCID) 40 mg tablet Take 40 mg by mouth daily.  LORazepam (ATIVAN) 0.5 mg tablet Take 0.5 mg by mouth as needed for Anxiety.  gabapentin (NEURONTIN) 300 mg capsule Take 600 mg by mouth nightly.  dicyclomine (BENTYL) 10 mg capsule Take 1 Cap by mouth four (4) times daily for 5 days. 20 Cap 0  
 ondansetron hcl (ZOFRAN) 4 mg tablet Take 1 Tab by mouth every eight (8) hours as needed for Nausea. 20 Tab 0  ibuprofen (MOTRIN) 600 mg tablet Take 1 Tab by mouth every eight (8) hours as needed for Pain. 30 Tab 0  
 CALCIUM CITRATE/VITAMIN D3 (CALCIUM CITRATE + PO) Take 400 mg by mouth.  simvastatin (ZOCOR) 20 mg tablet Take  by mouth nightly.  multivitamin (ONE A DAY) tablet Take 1 Tab by mouth daily.  allergy injection Past History Past Medical History: 
Past Medical History:  
Diagnosis Date  Depression  Hypercholesteremia  Hypercholesteremia  Hypertension Past Surgical History: 
Past Surgical History:  
Procedure Laterality Date  HX BUNIONECTOMY  HX TUBAL LIGATION    
 HX WISDOM TEETH EXTRACTION Family History: 
Family History Problem Relation Age of Onset  Kidney Disease Mother  Breast Cancer Mother 80 Social History: 
Social History Substance Use Topics  Smoking status: Former Smoker Packs/day: 1.00 Quit date: 3/12/2008  Smokeless tobacco: Never Used  Alcohol use Yes Comment: 2 glasses of wine a day Allergies: Allergies Allergen Reactions  Claritin [Loratadine] Other (comments) Night sweats  Levaquin [Levofloxacin] Other (comments) Chest pain  Other Medication Other (comments) Serovent inhalers  Tetracycline Other (comments) Yeast infx Review of Systems Review of Systems Constitutional: Negative for chills and fever. Respiratory: Negative for cough and shortness of breath. Cardiovascular: Negative for chest pain. Gastrointestinal: Positive for abdominal pain. Negative for constipation, diarrhea, nausea and vomiting. Neurological: Negative for weakness and numbness. All other systems reviewed and are negative. Physical Exam  
Physical Exam  
Constitutional: She is oriented to person, place, and time. She appears well-developed and well-nourished. HENT:  
Head: Normocephalic and atraumatic. Eyes: Conjunctivae and EOM are normal.  
Neck: Normal range of motion. Neck supple. Cardiovascular: Normal rate and regular rhythm. Pulmonary/Chest: Effort normal and breath sounds normal. No respiratory distress. Abdominal: Soft. She exhibits no distension. There is tenderness in the left lower quadrant. Point tenderness. No ecchymosis. No hernia. Musculoskeletal: Normal range of motion. Neurological: She is alert and oriented to person, place, and time. Skin: Skin is warm and dry. Psychiatric: She has a normal mood and affect. Nursing note and vitals reviewed. Diagnostic Study Results Labs - Recent Results (from the past 12 hour(s)) URINALYSIS W/ REFLEX CULTURE Collection Time: 10/11/18 11:29 AM  
Result Value Ref Range Color YELLOW/STRAW Appearance CLEAR CLEAR Specific gravity <1.005 1.003 - 1.030  
 pH (UA) 6.5 5.0 - 8.0 Protein NEGATIVE  NEG mg/dL Glucose NEGATIVE  NEG mg/dL Ketone NEGATIVE  NEG mg/dL Bilirubin NEGATIVE  NEG Blood TRACE (A) NEG Urobilinogen 0.2 0.2 - 1.0 EU/dL Nitrites NEGATIVE  NEG Leukocyte Esterase NEGATIVE  NEG    
 WBC 0-4 0 - 4 /hpf  
 RBC 0-5 0 - 5 /hpf Epithelial cells FEW FEW /lpf  Bacteria NEGATIVE  NEG /hpf  
 UA: UC IF INDICATED CULTURE NOT INDICATED BY UA RESULT CNI Hyaline cast 0-2 0 - 5 /lpf  
CBC WITH AUTOMATED DIFF Collection Time: 10/11/18 11:41 AM  
Result Value Ref Range WBC 5.6 3.6 - 11.0 K/uL  
 RBC 4.93 3.80 - 5.20 M/uL  
 HGB 14.3 11.5 - 16.0 g/dL HCT 44.4 35.0 - 47.0 % MCV 90.1 80.0 - 99.0 FL  
 MCH 29.0 26.0 - 34.0 PG  
 MCHC 32.2 30.0 - 36.5 g/dL  
 RDW 14.4 11.5 - 14.5 % PLATELET 323 197 - 354 K/uL MPV 10.4 8.9 - 12.9 FL  
 NRBC 0.0 0  WBC ABSOLUTE NRBC 0.00 0.00 - 0.01 K/uL NEUTROPHILS 66 32 - 75 % LYMPHOCYTES 24 12 - 49 % MONOCYTES 8 5 - 13 % EOSINOPHILS 1 0 - 7 % BASOPHILS 1 0 - 1 % IMMATURE GRANULOCYTES 1 (H) 0.0 - 0.5 % ABS. NEUTROPHILS 3.7 1.8 - 8.0 K/UL  
 ABS. LYMPHOCYTES 1.4 0.8 - 3.5 K/UL  
 ABS. MONOCYTES 0.5 0.0 - 1.0 K/UL  
 ABS. EOSINOPHILS 0.1 0.0 - 0.4 K/UL  
 ABS. BASOPHILS 0.0 0.0 - 0.1 K/UL  
 ABS. IMM. GRANS. 0.0 0.00 - 0.04 K/UL  
 DF AUTOMATED METABOLIC PANEL, COMPREHENSIVE Collection Time: 10/11/18 11:41 AM  
Result Value Ref Range Sodium 141 136 - 145 mmol/L Potassium 4.3 3.5 - 5.1 mmol/L Chloride 108 97 - 108 mmol/L  
 CO2 29 21 - 32 mmol/L Anion gap 4 (L) 5 - 15 mmol/L Glucose 108 (H) 65 - 100 mg/dL BUN 12 6 - 20 MG/DL Creatinine 0.63 0.55 - 1.02 MG/DL  
 BUN/Creatinine ratio 19 12 - 20 GFR est AA >60 >60 ml/min/1.73m2 GFR est non-AA >60 >60 ml/min/1.73m2 Calcium 9.2 8.5 - 10.1 MG/DL Bilirubin, total 0.4 0.2 - 1.0 MG/DL  
 ALT (SGPT) 22 12 - 78 U/L  
 AST (SGOT) 24 15 - 37 U/L Alk. phosphatase 110 45 - 117 U/L Protein, total 7.5 6.4 - 8.2 g/dL Albumin 3.9 3.5 - 5.0 g/dL Globulin 3.6 2.0 - 4.0 g/dL A-G Ratio 1.1 1.1 - 2.2 Radiologic Studies -  
CT ABD PELV W CONT Final Result CT Results  (Last 48 hours) 10/11/18 1335  CT ABD PELV W CONT Final result Impression:  IMPRESSION:  
No acute intraperitoneal process is identified. Extensive sigmoid colonic diverticulosis without evidence of diverticulitis. Normal appendix. Left renal cysts. Nonobstructive renal calculi on the right and on the left. Narrative:  EXAM:  CT ABD PELV W CONT History: Left lower quadrant pain INDICATION: Abdominal pain; LLQ pain COMPARISON: 12/13/2012 CONTRAST:  100 mL of Isovue-370. TECHNIQUE:   
Following the uneventful intravenous administration of contrast, thin axial  
images were obtained through the abdomen and pelvis. Coronal and sagittal  
reconstructions were generated. Oral contrast was not administered. CT dose  
reduction was achieved through use of a standardized protocol tailored for this  
examination and automatic exposure control for dose modulation. FINDINGS:   
LUNG BASES: Clear. INCIDENTALLY IMAGED HEART AND MEDIASTINUM: Unremarkable. LIVER: Portal vein patent. Tiny hepatic hypodensities most likely simple cysts  
not significantly changed. GALLBLADDER: Unremarkable. SPLEEN: No mass. PANCREAS: No mass or ductal dilatation. ADRENALS: Unremarkable. KIDNEYS: Nonobstructive right and left renal calculi. No hydroureter or  
hydronephrosis. Left renal cyst.  
STOMACH: Unremarkable. SMALL BOWEL: No dilatation or wall thickening. COLON: Extensive colonic diverticulosis. There is no evidence of diverticulitis. APPENDIX: Unremarkable. PERITONEUM: No ascites or pneumoperitoneum. RETROPERITONEUM: Mild levoscoliosis. Mild aortic atherosclerotic change. REPRODUCTIVE ORGANS: Unremarkable. URINARY BLADDER: No mass or calculus. BONES: Minimal anterolisthesis of L4 on L5.  
ADDITIONAL COMMENTS: N/A Medical Decision Making I am the first provider for this patient. I reviewed the vital signs, available nursing notes, past medical history, past surgical history, family history and social history. Vital Signs-Reviewed the patient's vital signs. Patient Vitals for the past 12 hrs: 
 Temp Pulse Resp BP SpO2  
10/11/18 1409 98 °F (36.7 °C) 85 16 140/75 -  
10/11/18 1118 97.9 °F (36.6 °C) 86 16 150/79 100 % Pulse Oximetry Analysis - 100% on RA Cardiac Monitor:  
Rate: 86 bpm 
Rhythm: Normal Sinus Rhythm Records Reviewed: Nursing Notes and Old Medical Records Provider Notes (Medical Decision Making):  
Patient presents with abdominal pain. DDx: Gastroenteritis, SBO, appendicitis, colitis, IBD, diverticulitis, mesenteric ischemia, AAA or descending dissection, ACS, ureteral stone. Will get labs and CT Abdomen. ED Course:  
Initial assessment performed. The patients presenting problems have been discussed, and they are in agreement with the care plan formulated and outlined with them. I have encouraged them to ask questions as they arise throughout their visit. Critical Care Time:  
0 Disposition: 
Discharge Note: 
2:00 PM 
The pt is ready for discharge. Given abdominal pain and possible inflammation, pt will do ibuprofen 600 QID. Also prescribe bentyl if  needed. The pt's signs, symptoms, diagnosis, and discharge instructions have been discussed and pt has conveyed their understanding. The pt is to follow up as recommended or return to ER should their symptoms worsen. Plan has been discussed and pt is in agreement. PLAN: 
1. Discharge Discharge Medication List as of 10/11/2018  2:00 PM  
  
START taking these medications Details  
dicyclomine (BENTYL) 10 mg capsule Take 1 Cap by mouth four (4) times daily for 5 days. , Normal, Disp-20 Cap, R-0  
  
ondansetron hcl (ZOFRAN) 4 mg tablet Take 1 Tab by mouth every eight (8) hours as needed for Nausea., Normal, Disp-20 Tab, R-0  
  
  
CONTINUE these medications which have CHANGED Details  
ibuprofen (MOTRIN) 600 mg tablet Take 1 Tab by mouth every eight (8) hours as needed for Pain., Normal, Disp-30 Tab, R-0  
  
  
CONTINUE these medications which have NOT CHANGED Details levalbuterol tartrate (XOPENEX HFA) 45 mcg/actuation inhaler Take 2 Puffs by inhalation every six (6) hours as needed for Wheezing., Historical Med Desvenlafaxine (PRISTIQ) 100 mg Tb24 Take 100 mg by mouth., Historical Med  
  
omega 3-dha-epa-fish oil (FISH OIL) 100-160-1,000 mg cap Take 1 Tab by mouth daily. , Historical Med  
  
famotidine (PEPCID) 40 mg tablet Take 40 mg by mouth daily. , Historical Med LORazepam (ATIVAN) 0.5 mg tablet Take 0.5 mg by mouth as needed for Anxiety. , Historical Med  
  
gabapentin (NEURONTIN) 300 mg capsule Take 600 mg by mouth nightly., Historical Med CALCIUM CITRATE/VITAMIN D3 (CALCIUM CITRATE + PO) Take 400 mg by mouth., Historical Med  
  
simvastatin (ZOCOR) 20 mg tablet Take  by mouth nightly., Historical Med  
  
multivitamin (ONE A DAY) tablet Take 1 Tab by mouth daily. , Historical Med  
  
allergy injection Historical Med 2. Follow-up Information Follow up With Details Comments Contact Info Elissa Hammond MD  As needed [address] [place] [address] 
[phone] Return to ED if worse Diagnosis Clinical Impression: 1. Abdominal pain, LLQ (left lower quadrant) Attestations: This note is prepared by Lathan Spurling, acting as Scribe for MD Manisha Candelaria MD: The scribe's documentation has been prepared under my direction and personally reviewed by me in its entirety. I confirm that the note above accurately reflects all work, treatment, procedures, and medical decision making performed by me.

## 2019-09-27 ENCOUNTER — HOSPITAL ENCOUNTER (OUTPATIENT)
Dept: MAMMOGRAPHY | Age: 64
Discharge: HOME | End: 2019-09-27
Attending: FAMILY MEDICINE
Payer: COMMERCIAL

## 2019-09-27 DIAGNOSIS — Z12.31 VISIT FOR SCREENING MAMMOGRAM: ICD-10-CM

## 2019-09-27 PROCEDURE — 77067 SCR MAMMO BI INCL CAD: CPT

## 2021-03-03 ENCOUNTER — TRANSCRIBE ORDER (OUTPATIENT)
Dept: SCHEDULING | Age: 66
End: 2021-03-03

## 2021-03-03 DIAGNOSIS — Z12.31 VISIT FOR SCREENING MAMMOGRAM: Primary | ICD-10-CM

## 2021-08-06 ENCOUNTER — ANESTHESIA EVENT (OUTPATIENT)
Dept: ENDOSCOPY | Age: 66
End: 2021-08-06
Payer: MEDICARE

## 2021-08-06 ENCOUNTER — ANESTHESIA (OUTPATIENT)
Dept: ENDOSCOPY | Age: 66
End: 2021-08-06
Payer: MEDICARE

## 2021-08-06 ENCOUNTER — HOSPITAL ENCOUNTER (OUTPATIENT)
Age: 66
Setting detail: OUTPATIENT SURGERY
Discharge: HOME | End: 2021-08-06
Attending: SPECIALIST | Admitting: SPECIALIST
Payer: MEDICARE

## 2021-08-06 VITALS
SYSTOLIC BLOOD PRESSURE: 156 MMHG | DIASTOLIC BLOOD PRESSURE: 97 MMHG | HEIGHT: 64 IN | RESPIRATION RATE: 11 BRPM | HEART RATE: 55 BPM | OXYGEN SATURATION: 100 % | WEIGHT: 205.8 LBS | BODY MASS INDEX: 35.13 KG/M2 | TEMPERATURE: 97.8 F

## 2021-08-06 PROCEDURE — 2709999900 HC NON-CHARGEABLE SUPPLY: Performed by: SPECIALIST

## 2021-08-06 PROCEDURE — 74011000250 HC RX REV CODE- 250: Performed by: ANESTHESIOLOGY

## 2021-08-06 PROCEDURE — 77030039825 HC MSK NSL PAP SUPERNO2VA VYRM -B: Performed by: ANESTHESIOLOGY

## 2021-08-06 PROCEDURE — 77030013992 HC SNR POLYP ENDOSC BSC -B: Performed by: SPECIALIST

## 2021-08-06 PROCEDURE — 74011250636 HC RX REV CODE- 250/636: Performed by: ANESTHESIOLOGY

## 2021-08-06 PROCEDURE — 76060000032 HC ANESTHESIA 0.5 TO 1 HR: Performed by: SPECIALIST

## 2021-08-06 PROCEDURE — 76040000007: Performed by: SPECIALIST

## 2021-08-06 PROCEDURE — 74011250636 HC RX REV CODE- 250/636: Performed by: SPECIALIST

## 2021-08-06 PROCEDURE — 88305 TISSUE EXAM BY PATHOLOGIST: CPT

## 2021-08-06 PROCEDURE — 77030019988 HC FCPS ENDOSC DISP BSC -B: Performed by: SPECIALIST

## 2021-08-06 RX ORDER — PROPOFOL 10 MG/ML
INJECTION, EMULSION INTRAVENOUS AS NEEDED
Status: DISCONTINUED | OUTPATIENT
Start: 2021-08-06 | End: 2021-08-06 | Stop reason: HOSPADM

## 2021-08-06 RX ORDER — SODIUM CHLORIDE 9 MG/ML
50 INJECTION, SOLUTION INTRAVENOUS CONTINUOUS
Status: DISCONTINUED | OUTPATIENT
Start: 2021-08-06 | End: 2021-08-06 | Stop reason: HOSPADM

## 2021-08-06 RX ORDER — SODIUM CHLORIDE 0.9 % (FLUSH) 0.9 %
5-40 SYRINGE (ML) INJECTION EVERY 8 HOURS
Status: DISCONTINUED | OUTPATIENT
Start: 2021-08-06 | End: 2021-08-06 | Stop reason: HOSPADM

## 2021-08-06 RX ORDER — DEXTROMETHORPHAN/PSEUDOEPHED 2.5-7.5/.8
1.2 DROPS ORAL
Status: DISCONTINUED | OUTPATIENT
Start: 2021-08-06 | End: 2021-08-06 | Stop reason: HOSPADM

## 2021-08-06 RX ORDER — SODIUM CHLORIDE 0.9 % (FLUSH) 0.9 %
5-40 SYRINGE (ML) INJECTION AS NEEDED
Status: DISCONTINUED | OUTPATIENT
Start: 2021-08-06 | End: 2021-08-06 | Stop reason: HOSPADM

## 2021-08-06 RX ORDER — LIDOCAINE HYDROCHLORIDE 20 MG/ML
INJECTION, SOLUTION EPIDURAL; INFILTRATION; INTRACAUDAL; PERINEURAL AS NEEDED
Status: DISCONTINUED | OUTPATIENT
Start: 2021-08-06 | End: 2021-08-06 | Stop reason: HOSPADM

## 2021-08-06 RX ADMIN — SODIUM CHLORIDE 50 ML/HR: 9 INJECTION, SOLUTION INTRAVENOUS at 09:08

## 2021-08-06 RX ADMIN — PROPOFOL 440 MG: 10 INJECTION, EMULSION INTRAVENOUS at 09:55

## 2021-08-06 RX ADMIN — LIDOCAINE HYDROCHLORIDE 100 MG: 20 INJECTION, SOLUTION EPIDURAL; INFILTRATION; INTRACAUDAL; PERINEURAL at 09:21

## 2021-08-06 NOTE — PROGRESS NOTES
Endoscope was pre-cleaned at the bedside immediately following procedure by Mil Meraz. For medications administered by anesthesia, see anesthesia chart.

## 2021-08-06 NOTE — ANESTHESIA PREPROCEDURE EVALUATION
Relevant Problems   CARDIOVASCULAR   (+) Undiagnosed cardiac murmurs       Anesthetic History   No history of anesthetic complications            Review of Systems / Medical History  Patient summary reviewed, nursing notes reviewed and pertinent labs reviewed    Pulmonary          Smoker      Comments: Former Smoker   Neuro/Psych         Psychiatric history    Comments: Depression  Cardiovascular    Hypertension          Hyperlipidemia    Exercise tolerance: >4 METS  Comments: Hx Palpitations     GI/Hepatic/Renal               Comments: ALTERED BOWEL FUNCTION, HISTORY POLYPS Endo/Other  Within defined limits           Other Findings              Physical Exam    Airway  Mallampati: II    Neck ROM: normal range of motion   Mouth opening: Normal     Cardiovascular  Regular rate and rhythm,  S1 and S2 normal,  no murmur, click, rub, or gallop             Dental      Comments: 1 missing tooth   Pulmonary  Breath sounds clear to auscultation               Abdominal  GI exam deferred       Other Findings            Anesthetic Plan    ASA: 2  Anesthesia type: total IV anesthesia          Induction: Intravenous  Anesthetic plan and risks discussed with: Patient

## 2021-08-06 NOTE — DISCHARGE INSTRUCTIONS
Angel Ugarte  [id number]  1955    COLON / EGD DISCHARGE INSTRUCTIONS  Discomfort:  Sore throat- throat lozenges or warm salt water gargle  Redness at IV site- apply warm compress to area; if redness or soreness persist- contact your physician  There may be a slight amount of blood passed from the rectum  Gaseous discomfort- walking, belching will help relieve any discomfort  You may not operate a vehicle for 12 hours  You may not engage in an occupation involving machinery or appliances for rest of today  You may not drink alcoholic beverages for at least 12 hours  Avoid making any critical decisions for at least 24 hour  DIET:   Regular diet. - however -  remember your colon is empty and a heavy meal will produce gas. Avoid these foods:  vegetables, fried / greasy foods, carbonated drinks for today     ACTIVITY:  You may  resume your normal daily activities it is recommended that you spend the remainder of the day resting -  avoid any strenuous activity. CALL M.D. ANY SIGN OF:   Increasing pain, nausea, vomiting  Abdominal distension (swelling)  New increased bleeding (oral or rectal)  Fever (chills)  Pain in chest area  Bloody discharge from nose or mouth  Shortness of breath    Tylenol as needed for pain. Follow-up Instructions:   Call Dr. Jo Ann Og for results of procedure / biopsy in 7 daysat telephone #  [phone]  Additional instructions: repeat colonoscopy in 5 years                  Angel Ugarte  [id number]  1955        DISCHARGE SUMMARY from Nurse    The following personal items collected during your admission are returned to you:   Dental Appliance: Dental Appliances: None  Vision: Visual Aid: None  Hearing Aid:    Jewelry:    Clothing:    Other Valuables:    Valuables sent to safe:      PATIENT INSTRUCTIONS:    Take Home Medications:  {Medication reconciliation information is now added to the patient's AVS automatically when it is printed.   There is no need to use this SmartLink in discharge instructions.   Highlight this text and delete it to clear this message}

## 2021-08-06 NOTE — ANESTHESIA POSTPROCEDURE EVALUATION
Procedure(s):  COLONOSCOPY  ESOPHAGOGASTRODUODENOSCOPY (EGD)  ESOPHAGOGASTRODUODENAL (EGD) BIOPSY  ENDOSCOPIC POLYPECTOMY. total IV anesthesia    Anesthesia Post Evaluation        Patient location during evaluation: PACU  Note status: Adequate. Level of consciousness: responsive to verbal stimuli and sleepy but conscious  Pain management: satisfactory to patient  Airway patency: patent  Anesthetic complications: no  Cardiovascular status: acceptable  Respiratory status: acceptable  Hydration status: acceptable  Comments: +Post-Anesthesia Evaluation and Assessment    Patient: Elias Cummins MRN: [id number]  SSN: [id number]   YOB: 1955  Age: 76 y.o. Sex: female      Cardiovascular Function/Vital Signs    /66   Pulse 80   Temp 36.5 °C (97.7 °F)   Resp 12   Ht 5' 4\" (1.626 m)   Wt 93.4 kg (205 lb 12.8 oz)   SpO2 97%   Breastfeeding No   BMI 35.33 kg/m²     Patient is status post Procedure(s):  COLONOSCOPY  ESOPHAGOGASTRODUODENOSCOPY (EGD)  ESOPHAGOGASTRODUODENAL (EGD) BIOPSY  ENDOSCOPIC POLYPECTOMY. Nausea/Vomiting: Controlled. Postoperative hydration reviewed and adequate. Pain:  Pain Scale 1: Numeric (0 - 10) (08/06/21 0959)  Pain Intensity 1: 0 (08/06/21 0959)   Managed. Neurological Status: At baseline. Mental Status and Level of Consciousness: Arousable. Pulmonary Status:   O2 Device: None (Room air) (08/06/21 0959)   Adequate oxygenation and airway patent. Complications related to anesthesia: None    Post-anesthesia assessment completed. No concerns. Signed By: Glenn Moore MD    8/6/2021  Post anesthesia nausea and vomiting:  controlled      INITIAL Post-op Vital signs: No vitals data found for the desired time range.

## 2021-08-06 NOTE — PROCEDURES
Esophagogastroduodenoscopy Procedure Note      [facility]  1955  [id number]    Indication:  Intestinal metaplasia     Endoscopist: Jose A Nick MD    Referring Provider:  Sheng Fagan MD    Sedation:  MAC anesthesia Propofol    Procedure Details:  After infomed consent was obtained for the procedure, with all risks and benefits of procedure explained the patient was taken to the endoscopy suite and placed in the left lateral decubitus position. Following sequential administration of sedation as per above, the endoscope was inserted into the mouth and advanced under direct vision to second portion of the duodenum. A careful inspection was made as the gastroscope was withdrawn, including a retroflexed view of the proximal stomach; findings and interventions are described below. Findings:     Esophagus:   + Normal mucosa throughout the entire esophagus. Normal Z line located at 38 cm.  + Small 3 cm hiatal hernia    Stomach:   + Mild erythema in the body and antrum without any erosions or ulcers s/p Bx. Duodenum:   - Normal mucosa to the second portion s/p Bx. Therapies: as above    Specimen: Specimens were collected as described and send to the laboratory. Complications:   None were encountered during the procedure. EBL:  < 10 ml.           Recommendations:   -f/u path    Jose A Nick MD  8/6/2021  9:56 AM

## 2021-08-06 NOTE — PROCEDURES
Colonoscopy Procedure Note    Indications:   Personal history of colon polyps (screening only)    Referring Physician: Sunshine Olivera MD  Anesthesia/Sedation: MAC anesthesia Propofol  Endoscopist:  Dr. Leydi Barney    Procedure in Detail:  Informed consent was obtained for the procedure, including sedation. Risks of perforation, hemorrhage, adverse drug reaction, and aspiration were discussed. The patient was placed in the left lateral decubitus position. Based on the pre-procedure assessment, including review of the patient's medical history, medications, allergies, and review of systems, she had been deemed to be an appropriate candidate for moderate sedation; she was therefore sedated with the medications listed above. The patient was monitored continuously with ECG tracing, pulse oximetry, blood pressure monitoring, and direct observations. A rectal examination was performed. The [id number] was inserted into the rectum and advanced under direct vision to the terminal ileum. The quality of the colonic preparation was adequate. A careful inspection was made as the colonoscope was withdrawn, including a retroflexed view of the rectum; findings and interventions are described below. Appropriate photodocumentation was obtained. Findings:     1. Scope advanced to the cecum and terminal ileum. 2.  There was a 5 mm polyp in transverse colon s/p cold snare removal                            1 cm sessile polyp in the ascending colon s/p hot snare removal                            (2) 4 mm polyps in the hepatic flexure s/p cold snare removal                            (2) 4 mm sessile polyps in the rectum s/p cold snare removal  3. Sigmoid diverticulosis. 4.  Small internal hemorrhoids. Therapies:  See above    Specimen: Specimens were collected as described above and sent to pathology. Complications: None were encountered during the procedure. EBL: < 10 ml.     Recommendations: -f/u path  -repeat colonoscopy in 5 years    Signed By: Rema Elizondo MD                        August 6, 2021

## 2021-08-06 NOTE — H&P
Gastroenterology Outpatient History and Physical    Patient: Krystle Zhang    Physician: Philip Springer MD    Vital Signs: There were no vitals taken for this visit. Allergies: Allergies   Allergen Reactions    Claritin [Loratadine] Other (comments)     Night sweats      Levaquin [Levofloxacin] Other (comments)     Chest pain    Other Medication Other (comments)     Serovent inhalers    Tetracycline Other (comments)     Yeast infx       Chief Complaint: 75 yo WF for EGD for dyspepsia and h/o gastric intestinal metaplasia; H/O Colon Polyps    History of Present Illness: above    Justification for Procedure: above    History:  Past Medical History:   Diagnosis Date    Depression     Hypercholesteremia     Hypercholesteremia     Hypertension     Pt denies       Past Surgical History:   Procedure Laterality Date    HX BUNIONECTOMY      HX TUBAL LIGATION      HX WISDOM TEETH EXTRACTION        Social History     Socioeconomic History    Marital status:      Spouse name: Not on file    Number of children: Not on file    Years of education: Not on file    Highest education level: Not on file   Tobacco Use    Smoking status: Former Smoker     Packs/day: 1.00     Quit date: 3/12/2008     Years since quittin.4    Smokeless tobacco: Never Used   Substance and Sexual Activity    Alcohol use: Yes     Comment: 2 glasses of wine a day    Drug use: No     Social Determinants of Health     Financial Resource Strain:     Difficulty of Paying Living Expenses:    Food Insecurity:     Worried About Running Out of Food in the Last Year:     Ran Out of Food in the Last Year:    Transportation Needs:     Lack of Transportation (Medical):      Lack of Transportation (Non-Medical):    Physical Activity:     Days of Exercise per Week:     Minutes of Exercise per Session:    Stress:     Feeling of Stress :    Social Connections:     Frequency of Communication with Friends and Family:     Frequency of Social Gatherings with Friends and Family:     Attends Hoahaoism Services:     Active Member of Clubs or Organizations:     Attends Club or Organization Meetings:     Marital Status:       Family History   Problem Relation Age of Onset    Kidney Disease Mother     Breast Cancer Mother 80    Breast Cancer Maternal Aunt        Medications:   Prior to Admission medications    Medication Sig Start Date End Date Taking? Authorizing Provider   levalbuterol tartrate (XOPENEX HFA) 45 mcg/actuation inhaler Take 2 Puffs by inhalation every six (6) hours as needed for Wheezing. Yes Other, MD Madison   Desvenlafaxine (PRISTIQ) 100 mg Tb24 Take 100 mg by mouth. Yes Other, MD Madison   omega 3-dha-epa-fish oil (FISH OIL) 100-160-1,000 mg cap Take 1 Tab by mouth daily. Yes Other, MD Madison   famotidine (PEPCID) 40 mg tablet Take 40 mg by mouth daily. Yes Other, MD Madison   LORazepam (ATIVAN) 0.5 mg tablet Take 0.5 mg by mouth as needed for Anxiety. Yes Other, MD Madison   gabapentin (NEURONTIN) 300 mg capsule Take 600 mg by mouth nightly. Yes Other, MD Madison   ibuprofen (MOTRIN) 600 mg tablet Take 1 Tab by mouth every eight (8) hours as needed for Pain. 10/11/18  Yes Srini Quesada MD   CALCIUM CITRATE/VITAMIN D3 (CALCIUM CITRATE + PO) Take 400 mg by mouth. Yes Provider, Historical   simvastatin (ZOCOR) 20 mg tablet Take  by mouth nightly. Yes Provider, Historical   multivitamin (ONE A DAY) tablet Take 1 Tab by mouth daily. Yes Provider, Historical   allergy injection    Yes Provider, Historical       Physical Exam:   General: alert, no distress   HEENT: Head: Normocephalic, no lesions, without obvious abnormality.    Heart: regular rate and rhythm, S1, S2 normal, no murmur, click, rub or gallop   Lungs: chest clear, no wheezing, rales, normal symmetric air entry   Abdominal: soft, NT/ND + BS   Neurological: Grossly normal   Extremities: extremities normal, atraumatic, no cyanosis or edema Findings/Diagnosis: Gastritis with IM; H/O Colon polyps    Plan of Care/Planned Procedure: EGD and Colonoscopy

## 2021-08-06 NOTE — ROUTINE PROCESS
Serjio Dooleya [facility]  1955  [id number]    Situation:  Verbal report received from: Azra Jean RN  Procedure: Procedure(s):  COLONOSCOPY  ESOPHAGOGASTRODUODENOSCOPY (EGD)  ESOPHAGOGASTRODUODENAL (EGD) BIOPSY  ENDOSCOPIC POLYPECTOMY    Background:    Preoperative diagnosis: ALTERED BOWEL FUNCTION, HISTORY POLYPS  Postoperative diagnosis: EGD:Hiatal hernia, gastritis  COLON:Colon Polyps, Diverticulosis, hemorrhoids    :  Dr. Christy Daily  Assistant(s): Endoscopy Technician-1: Corrie Finch  Endoscopy RN-1: Evi Munoz    Specimens:   ID Type Source Tests Collected by Time Destination   1 : Stomach BX Preservative Stomach  Mary Drew MD 8/6/2021 4888 Pathology   2 : Duodenum BX Preservative Duodenum  Mary Drew MD 8/6/2021 0932 Pathology   3 : Transverse Polyp Preservative Colon, Transverse  Mary Drew MD 8/6/2021 3048 Pathology   4 : Ascending Colon Polyp Preservative Colon, Ascending  Mary Drew MD 8/6/2021 3258 Pathology   5 : Hepatic Flexure Polyp Preservative Hepatic Flexure  Mary Drew MD 8/6/2021 6341 Pathology   6 : Rectum Polyp Preservative Rectum  Mary Drew MD 8/6/2021 4583 Pathology     H. Pylori  no    Assessment:  Intra-procedure medications     Anesthesia gave intra-procedure sedation and medications, see anesthesia flow sheet yes    Intravenous fluids: NS@ KVO     Vital signs stable   yes    Abdominal assessment: round and soft   yes    Recommendation:  Discharge patient per MD order  yes.     Family or [place],   Permission to share finding with family or friend yes

## 2021-09-08 ENCOUNTER — TRANSCRIBE ORDER (OUTPATIENT)
Dept: SCHEDULING | Age: 66
End: 2021-09-08

## 2021-09-08 DIAGNOSIS — K44.9 GASTROESOPHAGEAL REFLUX DISEASE WITH HIATAL HERNIA: Primary | ICD-10-CM

## 2021-09-08 DIAGNOSIS — K21.9 GASTROESOPHAGEAL REFLUX DISEASE WITH HIATAL HERNIA: Primary | ICD-10-CM

## 2021-09-17 ENCOUNTER — HOSPITAL ENCOUNTER (OUTPATIENT)
Dept: GENERAL RADIOLOGY | Age: 66
Discharge: HOME | End: 2021-09-17
Attending: SPECIALIST
Payer: MEDICARE

## 2021-09-17 DIAGNOSIS — K21.9 GASTROESOPHAGEAL REFLUX DISEASE WITH HIATAL HERNIA: ICD-10-CM

## 2021-09-17 DIAGNOSIS — K44.9 GASTROESOPHAGEAL REFLUX DISEASE WITH HIATAL HERNIA: ICD-10-CM

## 2021-09-17 PROCEDURE — 74220 X-RAY XM ESOPHAGUS 1CNTRST: CPT

## 2021-10-04 ENCOUNTER — OFFICE VISIT (OUTPATIENT)
Dept: SURGERY | Age: 66
End: 2021-10-04
Payer: MEDICARE

## 2021-10-04 VITALS
WEIGHT: 193.1 LBS | DIASTOLIC BLOOD PRESSURE: 82 MMHG | TEMPERATURE: 97.3 F | BODY MASS INDEX: 32.97 KG/M2 | HEIGHT: 64 IN | RESPIRATION RATE: 20 BRPM | SYSTOLIC BLOOD PRESSURE: 140 MMHG | HEART RATE: 79 BPM | OXYGEN SATURATION: 93 %

## 2021-10-04 DIAGNOSIS — K44.9 HIATAL HERNIA: ICD-10-CM

## 2021-10-04 DIAGNOSIS — K21.9 GASTROESOPHAGEAL REFLUX DISEASE WITHOUT ESOPHAGITIS: Primary | ICD-10-CM

## 2021-10-04 PROCEDURE — G8427 DOCREV CUR MEDS BY ELIG CLIN: HCPCS | Performed by: SURGERY

## 2021-10-04 PROCEDURE — G8431 POS CLIN DEPRES SCRN F/U DOC: HCPCS | Performed by: SURGERY

## 2021-10-04 PROCEDURE — 1101F PT FALLS ASSESS-DOCD LE1/YR: CPT | Performed by: SURGERY

## 2021-10-04 PROCEDURE — G8399 PT W/DXA RESULTS DOCUMENT: HCPCS | Performed by: SURGERY

## 2021-10-04 PROCEDURE — 1090F PRES/ABSN URINE INCON ASSESS: CPT | Performed by: SURGERY

## 2021-10-04 PROCEDURE — G8417 CALC BMI ABV UP PARAM F/U: HCPCS | Performed by: SURGERY

## 2021-10-04 PROCEDURE — 3017F COLORECTAL CA SCREEN DOC REV: CPT | Performed by: SURGERY

## 2021-10-04 PROCEDURE — G8536 NO DOC ELDER MAL SCRN: HCPCS | Performed by: SURGERY

## 2021-10-04 PROCEDURE — 99204 OFFICE O/P NEW MOD 45 MIN: CPT | Performed by: SURGERY

## 2021-10-04 RX ORDER — SIMVASTATIN 20 MG/1
20 TABLET, FILM COATED ORAL DAILY
COMMUNITY
Start: 2021-02-19 | End: 2021-10-04 | Stop reason: ALTCHOICE

## 2021-10-04 RX ORDER — OMEPRAZOLE 20 MG/1
CAPSULE, DELAYED RELEASE ORAL
COMMUNITY
Start: 2021-09-03

## 2021-10-04 RX ORDER — DICYCLOMINE HYDROCHLORIDE 10 MG/1
CAPSULE ORAL
COMMUNITY
Start: 2021-01-24

## 2021-10-04 RX ORDER — DICLOFENAC SODIUM 10 MG/G
GEL TOPICAL
COMMUNITY
Start: 2021-09-20

## 2021-10-04 NOTE — PROGRESS NOTES
Identified pt with two pt identifiers(name and ). Reviewed record in preparation for visit and have obtained necessary documentation. All patient medications has been reviewed. Chief Complaint   Patient presents with    Possible Hernia     Seen at the request of Dr Carole Rob for eval of hernia        Health Maintenance Due   Topic    Hepatitis C Screening     Lipid Screen     DTaP/Tdap/Td series (1 - Tdap)    Shingrix Vaccine Age 49> (1 of 2)    Pneumococcal 65+ years (1 of 1 - PPSV23)    Breast Cancer Screen Mammogram     Medicare Yearly Exam     Flu Vaccine (1)       Vitals:    10/04/21 0844   BP: (!) 140/82   Pulse: 79   Resp: 20   Temp: 97.3 °F (36.3 °C)   TempSrc: Temporal   SpO2: 93%   Weight: 87.6 kg (193 lb 1.6 oz)   Height: 5' 4\" (1.626 m)   PainSc:   0 - No pain       4. Have you been to the ER, urgent care clinic since your last visit? Hospitalized since your last visit? No    5. Have you seen or consulted any other health care providers outside of the [address] [place] [address] since your last visit? Include any pap smears or colon screening. No      Patient is accompanied by  I have received verbal consent from Maximiliano Bellamy to discuss any/all medical information while they are present in the room.

## 2021-10-04 NOTE — PROGRESS NOTES
HISTORY OF PRESENT ILLNESS  Rayna Krishna is a 76 y.o. female who comes in for consultation by Caroline Garza MD for GERD  HPI  She has had some longstanding GERD. She had an EGD and colonoscopy in Aug 2021 by Dr Chemo Chan. Since then she has had severe burning in her throat and difficulty swallowing. EGD findings were a small 3 cm HH no esophagitis and mild erythema of the body/antrum with benign biopsies. Previously she had an EGD in 2017 with had mild gastritis and intestinal metaplasia and a BRAVO was done off PPI which was negative except +SAP with regurgitation. She had an UGI 2021 noted the [place] but otherwise was negative. There was no commentary about reflux and there are only seen still images to review today. She has tried PPI and famotidine without improvement. She states she is able to eat a sandwich with meat and white bread but not multigrain bread. She is very frustrated and tearful during the visit today. She reports weight loss due to inability to eat. The burning is the biggest issue. She also has a globus sensation in her throat. They have done food allergy testing as well.     Past Medical History:   Diagnosis Date    Anxiety     Depression     Hypercholesteremia     Hypercholesteremia     Hypertension     Pt denies      Past Surgical History:   Procedure Laterality Date    COLONOSCOPY N/A 2021    COLONOSCOPY performed by Parvin Huerta MD at [facility] ENDOSCOPY    HX BUNIONECTOMY      HX TUBAL LIGATION      HX WISDOM TEETH EXTRACTION       Family History   Problem Relation Age of Onset    Kidney Disease Mother     Breast Cancer Mother 80    Breast Cancer Maternal Aunt      Social History     Tobacco Use    Smoking status: Former Smoker     Packs/day: 1.00     Quit date: 3/12/2008     Years since quittin.5    Smokeless tobacco: Never Used   Vaping Use    Vaping Use: Never used   Substance Use Topics    Alcohol use: Not Currently     Comment: 2 glasses of wine a day    Drug use: No     Current Outpatient Medications   Medication Sig    multivitamin (MULTIPLE VITAMIN PO) Take 1 Tablet by mouth daily.  Omega 3-X56-FT-R2-Flpqasufbgu 500 mg-500 mcg -1 mg-12.5 mg cap Take 1 Tablet by mouth daily.  diclofenac (VOLTAREN) 1 % gel APPLY 2 INCH RIBBON TOPICAL THREE TIMES A DAY AS NEEDED    dicyclomine (BENTYL) 10 mg capsule TAKE 1 CAPSULE BY MOUTH THREE TIMES A DAY BEFORE MEALS FOR 30 DAYS    omeprazole (PRILOSEC) 20 mg capsule TAKE 1 CAPSULE BY MOUTH TWICE A DAY BEFORE MEALS FOR 30 DAYS    levalbuterol tartrate (XOPENEX HFA) 45 mcg/actuation inhaler Take 2 Puffs by inhalation every six (6) hours as needed for Wheezing.  LORazepam (ATIVAN) 0.5 mg tablet Take 0.5 mg by mouth as needed for Anxiety.  CALCIUM CITRATE/VITAMIN D3 (CALCIUM CITRATE + PO) Take 400 mg by mouth.  simvastatin (ZOCOR) 20 mg tablet Take  by mouth nightly.  allergy injection     Desvenlafaxine (PRISTIQ) 100 mg Tb24 Take 100 mg by mouth. (Patient not taking: Reported on 8/6/2021)    gabapentin (NEURONTIN) 300 mg capsule Take 600 mg by mouth nightly. (Patient not taking: Reported on 10/4/2021)     No current facility-administered medications for this visit. Allergies   Allergen Reactions    Claritin [Loratadine] Other (comments)     Night sweats      Levaquin [Levofloxacin] Other (comments)     Chest pain    Other Medication Other (comments)     Serovent inhalers    Tetracycline Other (comments)     Yeast infx       Review of Systems   Constitutional: Positive for weight loss. Negative for chills, diaphoresis and fever. HENT: Negative for sore throat. Eyes: Negative for blurred vision and discharge. Respiratory: Positive for wheezing. Negative for cough and shortness of breath. Cardiovascular: Negative for chest pain, palpitations, orthopnea, claudication and leg swelling. Gastrointestinal: Positive for constipation and heartburn.  Negative for abdominal pain, diarrhea, melena, nausea and vomiting. Genitourinary: Negative for dysuria, flank pain, frequency and hematuria. Musculoskeletal: Positive for back pain. Negative for joint pain, myalgias and neck pain. Skin: Negative for rash. Neurological: Negative for dizziness, speech change, focal weakness, seizures, loss of consciousness, weakness and headaches. Endo/Heme/Allergies: Does not bruise/bleed easily. Psychiatric/Behavioral: Positive for depression. Negative for memory loss. The patient is nervous/anxious. Visit Vitals  BP (!) 140/82 (BP 1 Location: Left upper arm, BP Patient Position: Sitting) Comment: very upset and emotional   Pulse 79   Temp 97.3 °F (36.3 °C) (Temporal)   Resp 20   Ht 5' 4\" (1.626 m)   Wt 87.6 kg (193 lb 1.6 oz)   SpO2 93%   BMI 33.15 kg/m²       Physical Exam  Constitutional:       General: She is not in acute distress. Appearance: She is well-developed. She is not diaphoretic. HENT:      Head: Normocephalic and atraumatic. Mouth/Throat:      Pharynx: No oropharyngeal exudate. Eyes:      General: No scleral icterus. Conjunctiva/sclera: Conjunctivae normal.      Pupils: Pupils are equal, round, and reactive to light. Neck:      Thyroid: No thyromegaly. Vascular: No JVD. Trachea: No tracheal deviation. Cardiovascular:      Rate and Rhythm: Normal rate and regular rhythm. Heart sounds: No murmur heard. No friction rub. No gallop. Pulmonary:      Effort: Pulmonary effort is normal. No respiratory distress. Breath sounds: Normal breath sounds. No wheezing or rales. Abdominal:      General: Abdomen is protuberant. Bowel sounds are normal. There is no distension. Palpations: Abdomen is soft. There is no mass. Tenderness: There is no abdominal tenderness. There is no guarding or rebound. Negative signs include Bowers's sign and Rovsing's sign. Hernia: No hernia is present.  There is no hernia in the umbilical area or ventral area. Musculoskeletal:         General: Normal range of motion. Cervical back: Normal range of motion and neck supple. Lymphadenopathy:      Cervical: No cervical adenopathy. Skin:     General: Skin is warm and dry. Coloration: Skin is not pale. Findings: No erythema or rash. Neurological:      Mental Status: She is alert and oriented to person, place, and time. Cranial Nerves: No cranial nerve deficit. Psychiatric:         Attention and Perception: Attention normal.         Mood and Affect: Mood is anxious and depressed. Affect is labile and tearful. Speech: Speech normal.         Behavior: Behavior normal.         Thought Content: Thought content normal.         Cognition and Memory: Cognition normal.         Judgment: Judgment normal.       I reviewed her UGI images today  I reviewed notes from DR Santana Serna' office today as well. ASSESSMENT and PLAN  1.  GERD and hiatal hernia. She has burning and dysphagia. She claims that this only occurred after the recent EGD 8/2021. She is frustrated and wants to consider \"the magnet surgery\". I explained to her about the anatomy and pathophysiology of the GERD and hiatal hernia as well as esophageal dysfunction. At this point she is still interested in pursuing antifreflux surgery with a LINX procedure. I explained that I do not do those. 2.   Anxiety and depression. Very tearful during the visit. No SI or HI but feels that she may as well be dead. No plan.  with her today  She has tried multiple antidepressants and \"cannot take any of them\". She takes a lorazepam prn  I encourage her to f/u with Drake Arteaga MD of therapist    3. Hypercholesterolemia. On statin       I will reach out to Dr Santana Serna to get an esophageal manometry and have her follow up with Dr Mary Jo Hernandez in our group that does the [facility] procedure.     Beau Newell MD FACS

## 2021-11-08 ENCOUNTER — HOSPITAL ENCOUNTER (OUTPATIENT)
Dept: PREADMISSION TESTING | Age: 66
Discharge: HOME | End: 2021-11-08
Payer: MEDICARE

## 2021-11-08 PROCEDURE — U0005 INFEC AGEN DETEC AMPLI PROBE: HCPCS

## 2021-11-09 LAB
SARS-COV-2, XPLCVT: NOT DETECTED
SOURCE, COVRS: NORMAL

## 2021-11-11 ENCOUNTER — HOSPITAL ENCOUNTER (OUTPATIENT)
Age: 66
Setting detail: OUTPATIENT SURGERY
Discharge: HOME | End: 2021-11-11
Attending: SPECIALIST | Admitting: SPECIALIST
Payer: MEDICARE

## 2021-11-11 VITALS
HEART RATE: 69 BPM | DIASTOLIC BLOOD PRESSURE: 73 MMHG | SYSTOLIC BLOOD PRESSURE: 142 MMHG | OXYGEN SATURATION: 98 % | RESPIRATION RATE: 17 BRPM

## 2021-11-11 PROCEDURE — 74011000250 HC RX REV CODE- 250: Performed by: SPECIALIST

## 2021-11-11 PROCEDURE — 76040000008: Performed by: SPECIALIST

## 2021-11-11 PROCEDURE — 77030007009 HC CATH PH VRSFLX ALPN -C: Performed by: SPECIALIST

## 2021-11-11 PROCEDURE — 2709999900 HC NON-CHARGEABLE SUPPLY: Performed by: SPECIALIST

## 2021-11-11 RX ORDER — LIDOCAINE HYDROCHLORIDE 20 MG/ML
JELLY TOPICAL ONCE
Status: COMPLETED | OUTPATIENT
Start: 2021-11-11 | End: 2021-11-11

## 2021-11-11 RX ADMIN — LIDOCAINE HYDROCHLORIDE 5 ML: 20 JELLY TOPICAL at 10:40

## 2021-11-11 NOTE — DISCHARGE INSTRUCTIONS
Aylin Conroy  [id number]  1955      MANOMETRY DISCHARGE INSTRUCTION    You may resume your regular diet as tolerated. You may resume your normal daily activities. If you develop a sore throat- throat lozenges or warm salt water gargles will help. Call your Physician if you have any complications or questions. Aylin Conroy  [id number]  1955    24 HOUR PH MONITORING DISCHARGE INSTRUCTIONS    Please return to [facility] Endoscopy department at 11:30 tomorrow with your completed diary. ACTIVITY:  Avoid any activity that may get the data recorder wet. You may resume your normal daily activities. DIET:  You may resume your normal diet, HOWEVER avoid peanut butter and carbonated beverages for the next 24 hours. MEDICATIONS:  You may resume your normal medications with the exception of antacids    Mendel Alstrom, RN  AT  [phone] Office [phone] Pager IF YOU HAVE QUESTIONS, CONCERNS, OR TECHNICAL DIFFICULTIES DURING YOUR TEST. [employer] Activation    Thank you for requesting access to [employer]. Please follow the instructions below to securely access and download your online medical record. [employer] allows you to send messages to your doctor, view your test results, renew your prescriptions, schedule appointments, and more. How Do I Sign Up? 1. In your internet browser, go to [email or web address]  2. Click on the First Time User? Click Here link in the Sign In box. You will be redirect to the New Member Sign Up page. 3. Enter your [employer] Access Code exactly as it appears below. You will not need to use this code after youve completed the sign-up process. If you do not sign up before the expiration date, you must request a new code. [employer] Access Code: [email or web address]  Expires: 2021  9:46 AM (This is the date your [employer] access code will )    4.  Enter the last four digits of your Social Security Number (xxxx) and Date of Birth (mm/dd/yyyy) as indicated and click Submit. You will be taken to the next sign-up page. 5. Create a Access Information Management ID. This will be your Access Information Management login ID and cannot be changed, so think of one that is secure and easy to remember. 6. Create a Access Information Management password. You can change your password at any time. 7. Enter your Password Reset Question and Answer. This can be used at a later time if you forget your password. 8. Enter your e-mail address. You will receive e-mail notification when new information is available in [address] 9. Click Sign Up. You can now view and download portions of your medical record. 10. Click the Download Summary menu link to download a portable copy of your medical information. Additional Information    If you have questions, please visit the Frequently Asked Questions section of the Access Information Management website at [email or web address] Remember, Access Information Management is NOT to be used for urgent needs. For medical emergencies, dial 911.

## 2021-11-22 NOTE — OP NOTES
[address]  OPERATIVE REPORT    Name:  Fred Ma  MR#:  [id number]  :  1955  ACCOUNT #:  [de-identified]  DATE OF SERVICE:  2021    PREOPERATIVE DIAGNOSIS:  Gastroesophageal reflux symptoms. POSTOPERATIVE DIAGNOSES:  1. No Byrnedale diagnoses. 2.  All impedance boluses emptied completely. 3.  At conclusion of the procedure, 5 wet swallows are given in rapid succession and there is appropriate deglutitive inhibition. PROCEDURES PLANNED:  1. Esophageal manometry. 2.  Impedance manometry. SURGEON:  Wesley Salinas MD    ASSISTANT:  Yuval Roberts RN    ANESTHESIA:  Topical.    COMPLICATIONS:  None. SPECIMENS REMOVED:  None. IMPLANTS:  None. ESTIMATED BLOOD LOSS:  None. DESCRIPTION OF PROCEDURE:  High-resolution esophageal manometry was performed by the nursing staff with subsequent interpretation by Dr. Ankush Becerra. The lower esophageal sphincter is at 41 cm and for a distance of 5.9 cm. Lower esophageal sphincter pressures are elevated at rest 58.3. Respiratory minimum (4.8-32), respiratory mean 75.4 (13-43). Residual pressure after swallowing is normal at 14.3. I reviewed the tracing, I believe this is accurate. Wet swallows are administered. Byrnedale scoring is as follows:  Percent failed zero, percent weak zero, percent ineffective zero, percent panesophageal pressurization zero, percent premature zero, percent rapid zero, percent fragmented zero, percent intact 100. There are no hypercontractile swallows. Additional high-resolution scoring, distal latency 6.5, DCI normal 1878.1, highest DCI 3664.7, contractile front velocity 3.7. Additional scores, mean amplitude in the distal esophagus 78 (), mean wave duration normal 4.1, 10% double peaked waves, up to 15% is normal.  No triple peaked waves. The velocity in the distal esophagus is normal at 3.9 cm/sec. Wet swallows are administered.   Impedance manometry is measured with a flavored electrolyte solution. All impedance boluses emptied completely.       MD FERCHO [id number]  D:  11/21/2021 19:08  T:  11/21/2021 22:42  JOB #:  [id number]

## 2021-11-22 NOTE — OP NOTES
[address]  OPERATIVE REPORT    Name:  Rani Mayberry  MR#:  [id number]  :  1955  ACCOUNT #:  [de-identified]  DATE OF SERVICE:  2021    PROBE PLACED:  2021. PROBE REMOVED:  2021. PREOPERATIVE DIAGNOSIS:  Gastroesophageal reflux. POSTOPERATIVE DIAGNOSES:  1.  Felipe-DeMeester score 2.1, normal less than 14.72.  2.  No excess gastroesophageal reflux for acid, no excess numbers of weakly acidic non acid or acid reflux events. 3.  Reflux composition includes 25 liquid reflux events, 8 mixed and 31 gas only reflux events. Four of the liquid only occurred supine, all of the other events occurred upright. PROCEDURE PERFORMED:  24-hour pH impedance. SURGEON:  Lisa Acharya MD    ASSISTANT:  eT Alfaro RN    ANESTHESIA:  Topical.    COMPLICATIONS:  None. SPECIMENS REMOVED:  None. IMPLANTS:  None. ESTIMATED BLOOD LOSS:  None. DESCRIPTION OF PROCEDURE:  A 24 pH probe was placed on 2021 and the patient is monitored for 24 hours. Felipe-DeMeester score is as noted above 2.1. There is a total of 0.3% reflux during the entire study, 0.4% upright and 0% supine. There are 37 reflux events during the entire study, 33 upright, 4 supine. Eight of these events showed proximal reflux including 2 of the supine and 6 of the upright. Symptom associated probability for belch is 100%, symptom index 53.3. There were 15 episodes of belching. Symptom index for heartburn and cough and symptom associated probability are zero each. When total reflux events were scored, there were 10 acid reflux events, 32 weakly acidic reflux events. When reflux composition was evaluated, there were 28 liquid only reflux events, 25 upright, 4 supine. There were 8 mixed and there were 31 gas only reflux events, all of them were upright.   When all reflux is considered, belch had a 100%, symptom associated probability for all reflux of 99.8 for weakly acidic and 100 for acid and symptom index was 53.3 for all reflux, 26.7 for weakly acidic reflux and 26.7 for acid reflux. Mean baseline impedance 3.35. In summary, the total of acid reflux being negative is normal.  The total number of events is normal but there is a strong correlation between belching and both acid and weakly acidic reflux. Consider training in diaphragm breathing. Consider gastric emptying study.       Max Lowry MD      [id number]  D:  11/21/2021 19:14  T:  11/22/2021 0:18  JOB #:  [id number]

## 2021-11-25 ENCOUNTER — HOSPITAL ENCOUNTER (EMERGENCY)
Age: 66
Discharge: HOME | End: 2021-11-25
Attending: EMERGENCY MEDICINE
Payer: MEDICARE

## 2021-11-25 ENCOUNTER — APPOINTMENT (OUTPATIENT)
Dept: CT IMAGING | Age: 66
End: 2021-11-25
Attending: EMERGENCY MEDICINE
Payer: MEDICARE

## 2021-11-25 VITALS
DIASTOLIC BLOOD PRESSURE: 95 MMHG | SYSTOLIC BLOOD PRESSURE: 181 MMHG | WEIGHT: 181 LBS | TEMPERATURE: 97.4 F | HEIGHT: 64 IN | BODY MASS INDEX: 30.9 KG/M2 | RESPIRATION RATE: 14 BRPM | OXYGEN SATURATION: 99 % | HEART RATE: 77 BPM

## 2021-11-25 DIAGNOSIS — F41.1 ANXIETY STATE: ICD-10-CM

## 2021-11-25 DIAGNOSIS — K57.30 SIGMOID DIVERTICULOSIS: ICD-10-CM

## 2021-11-25 DIAGNOSIS — R10.9 ACUTE ABDOMINAL PAIN: ICD-10-CM

## 2021-11-25 DIAGNOSIS — K21.9 GASTROESOPHAGEAL REFLUX DISEASE WITHOUT ESOPHAGITIS: Primary | ICD-10-CM

## 2021-11-25 DIAGNOSIS — I10 ACCELERATED HYPERTENSION: ICD-10-CM

## 2021-11-25 LAB
ALBUMIN SERPL-MCNC: 3.8 G/DL (ref 3.5–5)
ALBUMIN/GLOB SERPL: 1.1 {RATIO} (ref 1.1–2.2)
ALP SERPL-CCNC: 92 U/L (ref 45–117)
ALT SERPL-CCNC: 42 U/L (ref 12–78)
ANION GAP SERPL CALC-SCNC: 4 MMOL/L (ref 5–15)
APPEARANCE UR: CLEAR
AST SERPL-CCNC: 31 U/L (ref 15–37)
BACTERIA URNS QL MICRO: NEGATIVE /HPF
BASOPHILS # BLD: 0.1 K/UL (ref 0–0.1)
BASOPHILS NFR BLD: 1 % (ref 0–1)
BILIRUB SERPL-MCNC: 0.4 MG/DL (ref 0.2–1)
BILIRUB UR QL: NEGATIVE
BUN SERPL-MCNC: 12 MG/DL (ref 6–20)
BUN/CREAT SERPL: 16 (ref 12–20)
CALCIUM SERPL-MCNC: 9.5 MG/DL (ref 8.5–10.1)
CHLORIDE SERPL-SCNC: 109 MMOL/L (ref 97–108)
CO2 SERPL-SCNC: 28 MMOL/L (ref 21–32)
COLOR UR: NORMAL
CREAT SERPL-MCNC: 0.74 MG/DL (ref 0.55–1.02)
DIFFERENTIAL METHOD BLD: NORMAL
EOSINOPHIL # BLD: 0.2 K/UL (ref 0–0.4)
EOSINOPHIL NFR BLD: 3 % (ref 0–7)
EPITH CASTS URNS QL MICRO: NORMAL /LPF
ERYTHROCYTE [DISTWIDTH] IN BLOOD BY AUTOMATED COUNT: 14 % (ref 11.5–14.5)
GLOBULIN SER CALC-MCNC: 3.4 G/DL (ref 2–4)
GLUCOSE SERPL-MCNC: 104 MG/DL (ref 65–100)
GLUCOSE UR STRIP.AUTO-MCNC: NEGATIVE MG/DL
HCT VFR BLD AUTO: 44.2 % (ref 35–47)
HGB BLD-MCNC: 14.2 G/DL (ref 11.5–16)
HGB UR QL STRIP: NEGATIVE
HYALINE CASTS URNS QL MICRO: NORMAL /LPF (ref 0–5)
IMM GRANULOCYTES # BLD AUTO: 0 K/UL (ref 0–0.04)
IMM GRANULOCYTES NFR BLD AUTO: 0 % (ref 0–0.5)
KETONES UR QL STRIP.AUTO: NEGATIVE MG/DL
LEUKOCYTE ESTERASE UR QL STRIP.AUTO: NEGATIVE
LIPASE SERPL-CCNC: 78 U/L (ref 73–393)
LYMPHOCYTES # BLD: 1.7 K/UL (ref 0.8–3.5)
LYMPHOCYTES NFR BLD: 28 % (ref 12–49)
MCH RBC QN AUTO: 28.1 PG (ref 26–34)
MCHC RBC AUTO-ENTMCNC: 32.1 G/DL (ref 30–36.5)
MCV RBC AUTO: 87.5 FL (ref 80–99)
MONOCYTES # BLD: 0.5 K/UL (ref 0–1)
MONOCYTES NFR BLD: 8 % (ref 5–13)
NEUTS SEG # BLD: 3.8 K/UL (ref 1.8–8)
NEUTS SEG NFR BLD: 60 % (ref 32–75)
NITRITE UR QL STRIP.AUTO: NEGATIVE
NRBC # BLD: 0 K/UL (ref 0–0.01)
NRBC BLD-RTO: 0 PER 100 WBC
PH UR STRIP: 6 [PH] (ref 5–8)
PLATELET # BLD AUTO: 264 K/UL (ref 150–400)
PMV BLD AUTO: 9.9 FL (ref 8.9–12.9)
POTASSIUM SERPL-SCNC: 3.7 MMOL/L (ref 3.5–5.1)
PROT SERPL-MCNC: 7.2 G/DL (ref 6.4–8.2)
PROT UR STRIP-MCNC: NEGATIVE MG/DL
RBC # BLD AUTO: 5.05 M/UL (ref 3.8–5.2)
RBC #/AREA URNS HPF: NORMAL /HPF (ref 0–5)
SODIUM SERPL-SCNC: 141 MMOL/L (ref 136–145)
SP GR UR REFRACTOMETRY: 1.01 (ref 1–1.03)
UA: UC IF INDICATED,UAUC: NORMAL
UROBILINOGEN UR QL STRIP.AUTO: 0.2 EU/DL (ref 0.2–1)
WBC # BLD AUTO: 6.2 K/UL (ref 3.6–11)
WBC URNS QL MICRO: NORMAL /HPF (ref 0–4)

## 2021-11-25 PROCEDURE — 81001 URINALYSIS AUTO W/SCOPE: CPT

## 2021-11-25 PROCEDURE — 74011250637 HC RX REV CODE- 250/637: Performed by: EMERGENCY MEDICINE

## 2021-11-25 PROCEDURE — 74011000636 HC RX REV CODE- 636: Performed by: EMERGENCY MEDICINE

## 2021-11-25 PROCEDURE — 74177 CT ABD & PELVIS W/CONTRAST: CPT

## 2021-11-25 PROCEDURE — 80053 COMPREHEN METABOLIC PANEL: CPT

## 2021-11-25 PROCEDURE — 85025 COMPLETE CBC W/AUTO DIFF WBC: CPT

## 2021-11-25 PROCEDURE — 74011000250 HC RX REV CODE- 250: Performed by: EMERGENCY MEDICINE

## 2021-11-25 PROCEDURE — 99283 EMERGENCY DEPT VISIT LOW MDM: CPT

## 2021-11-25 PROCEDURE — 36415 COLL VENOUS BLD VENIPUNCTURE: CPT

## 2021-11-25 PROCEDURE — 74011250636 HC RX REV CODE- 250/636: Performed by: EMERGENCY MEDICINE

## 2021-11-25 PROCEDURE — 83690 ASSAY OF LIPASE: CPT

## 2021-11-25 PROCEDURE — 96374 THER/PROPH/DIAG INJ IV PUSH: CPT

## 2021-11-25 RX ORDER — SUCRALFATE 1 G/1
1 TABLET ORAL 4 TIMES DAILY
Qty: 20 TABLET | Refills: 0 | Status: SHIPPED | OUTPATIENT
Start: 2021-11-25

## 2021-11-25 RX ADMIN — FAMOTIDINE 20 MG: 10 INJECTION, SOLUTION INTRAVENOUS at 19:48

## 2021-11-25 RX ADMIN — IOPAMIDOL 100 ML: 755 INJECTION, SOLUTION INTRAVENOUS at 21:27

## 2021-11-25 RX ADMIN — LIDOCAINE HYDROCHLORIDE 40 ML: 20 SOLUTION OROPHARYNGEAL at 19:52

## 2021-11-26 ENCOUNTER — HOSPITAL ENCOUNTER (EMERGENCY)
Age: 66
Discharge: HOME | End: 2021-11-26
Attending: EMERGENCY MEDICINE
Payer: MEDICARE

## 2021-11-26 VITALS
DIASTOLIC BLOOD PRESSURE: 87 MMHG | HEART RATE: 80 BPM | OXYGEN SATURATION: 99 % | TEMPERATURE: 97.3 F | RESPIRATION RATE: 16 BRPM | SYSTOLIC BLOOD PRESSURE: 134 MMHG | HEIGHT: 64 IN | WEIGHT: 175 LBS | BODY MASS INDEX: 29.88 KG/M2

## 2021-11-26 DIAGNOSIS — R10.84 ABDOMINAL PAIN, GENERALIZED: Primary | ICD-10-CM

## 2021-11-26 DIAGNOSIS — K21.9 GASTROESOPHAGEAL REFLUX DISEASE, UNSPECIFIED WHETHER ESOPHAGITIS PRESENT: ICD-10-CM

## 2021-11-26 LAB
ALBUMIN SERPL-MCNC: 3.9 G/DL (ref 3.5–5)
ALBUMIN/GLOB SERPL: 1.1 {RATIO} (ref 1.1–2.2)
ALP SERPL-CCNC: 107 U/L (ref 45–117)
ALT SERPL-CCNC: 43 U/L (ref 12–78)
ANION GAP SERPL CALC-SCNC: 7 MMOL/L (ref 5–15)
AST SERPL-CCNC: 29 U/L (ref 15–37)
BASOPHILS # BLD: 0 K/UL (ref 0–0.1)
BASOPHILS NFR BLD: 1 % (ref 0–1)
BILIRUB SERPL-MCNC: 0.4 MG/DL (ref 0.2–1)
BUN SERPL-MCNC: 9 MG/DL (ref 6–20)
BUN/CREAT SERPL: 10 (ref 12–20)
CALCIUM SERPL-MCNC: 9.7 MG/DL (ref 8.5–10.1)
CHLORIDE SERPL-SCNC: 109 MMOL/L (ref 97–108)
CO2 SERPL-SCNC: 26 MMOL/L (ref 21–32)
CREAT SERPL-MCNC: 0.86 MG/DL (ref 0.55–1.02)
DIFFERENTIAL METHOD BLD: NORMAL
EOSINOPHIL # BLD: 0.2 K/UL (ref 0–0.4)
EOSINOPHIL NFR BLD: 2 % (ref 0–7)
ERYTHROCYTE [DISTWIDTH] IN BLOOD BY AUTOMATED COUNT: 14.2 % (ref 11.5–14.5)
GLOBULIN SER CALC-MCNC: 3.7 G/DL (ref 2–4)
GLUCOSE SERPL-MCNC: 106 MG/DL (ref 65–100)
HCT VFR BLD AUTO: 42.9 % (ref 35–47)
HGB BLD-MCNC: 14.4 G/DL (ref 11.5–16)
IMM GRANULOCYTES # BLD AUTO: 0 K/UL (ref 0–0.04)
IMM GRANULOCYTES NFR BLD AUTO: 0 % (ref 0–0.5)
LYMPHOCYTES # BLD: 1.7 K/UL (ref 0.8–3.5)
LYMPHOCYTES NFR BLD: 27 % (ref 12–49)
MCH RBC QN AUTO: 28.5 PG (ref 26–34)
MCHC RBC AUTO-ENTMCNC: 33.6 G/DL (ref 30–36.5)
MCV RBC AUTO: 85 FL (ref 80–99)
MONOCYTES # BLD: 0.6 K/UL (ref 0–1)
MONOCYTES NFR BLD: 10 % (ref 5–13)
NEUTS SEG # BLD: 3.8 K/UL (ref 1.8–8)
NEUTS SEG NFR BLD: 60 % (ref 32–75)
NRBC # BLD: 0 K/UL (ref 0–0.01)
NRBC BLD-RTO: 0 PER 100 WBC
PLATELET # BLD AUTO: 244 K/UL (ref 150–400)
PMV BLD AUTO: 9.7 FL (ref 8.9–12.9)
POTASSIUM SERPL-SCNC: 3.8 MMOL/L (ref 3.5–5.1)
PROT SERPL-MCNC: 7.6 G/DL (ref 6.4–8.2)
RBC # BLD AUTO: 5.05 M/UL (ref 3.8–5.2)
SODIUM SERPL-SCNC: 142 MMOL/L (ref 136–145)
WBC # BLD AUTO: 6.2 K/UL (ref 3.6–11)

## 2021-11-26 PROCEDURE — 80053 COMPREHEN METABOLIC PANEL: CPT

## 2021-11-26 PROCEDURE — 36415 COLL VENOUS BLD VENIPUNCTURE: CPT

## 2021-11-26 PROCEDURE — 85025 COMPLETE CBC W/AUTO DIFF WBC: CPT

## 2021-11-26 PROCEDURE — 99281 EMR DPT VST MAYX REQ PHY/QHP: CPT

## 2021-11-26 RX ORDER — DICYCLOMINE HYDROCHLORIDE 20 MG/1
20 TABLET ORAL
Qty: 20 TABLET | Refills: 0 | Status: SHIPPED | OUTPATIENT
Start: 2021-11-26

## 2021-11-26 RX ORDER — LIDOCAINE HYDROCHLORIDE 20 MG/ML
15 SOLUTION OROPHARYNGEAL AS NEEDED
Qty: 150 ML | Refills: 0 | Status: SHIPPED | OUTPATIENT
Start: 2021-11-26

## 2021-11-26 NOTE — DISCHARGE INSTRUCTIONS
Thank You! It was a pleasure taking care of you in our Emergency Department today. We know that when you come to [facility], you are entrusting us with your health, comfort, and safety. Our physicians and nurses honor that trust, and truly appreciate the opportunity to care for you and your loved ones. We also value your feedback. If you receive a survey about your Emergency Department experience today, please fill it out. We care about our patients' feedback, and we listen to what you have to say. Thank you. Dr. Sridhar Stone M.D.      ________________________________________________________________________  I have included a copy of your lab results and/or radiologic studies from today's visit so you can have them easily available at your follow-up visit. We hope you feel better and please do not hesitate to contact the ED if you have any questions at all! Recent Results (from the past 12 hour(s))   CBC WITH AUTOMATED DIFF    Collection Time: 11/25/21  7:08 PM   Result Value Ref Range    WBC 6.2 3.6 - 11.0 K/uL    RBC 5.05 3.80 - 5.20 M/uL    HGB 14.2 11.5 - 16.0 g/dL    HCT 44.2 35.0 - 47.0 %    MCV 87.5 80.0 - 99.0 FL    MCH 28.1 26.0 - 34.0 PG    MCHC 32.1 30.0 - 36.5 g/dL    RDW 14.0 11.5 - 14.5 %    PLATELET 096 394 - 181 K/uL    MPV 9.9 8.9 - 12.9 FL    NRBC 0.0 0  WBC    ABSOLUTE NRBC 0.00 0.00 - 0.01 K/uL    NEUTROPHILS 60 32 - 75 %    LYMPHOCYTES 28 12 - 49 %    MONOCYTES 8 5 - 13 %    EOSINOPHILS 3 0 - 7 %    BASOPHILS 1 0 - 1 %    IMMATURE GRANULOCYTES 0 0.0 - 0.5 %    ABS. NEUTROPHILS 3.8 1.8 - 8.0 K/UL    ABS. LYMPHOCYTES 1.7 0.8 - 3.5 K/UL    ABS. MONOCYTES 0.5 0.0 - 1.0 K/UL    ABS. EOSINOPHILS 0.2 0.0 - 0.4 K/UL    ABS. BASOPHILS 0.1 0.0 - 0.1 K/UL    ABS. IMM.  GRANS. 0.0 0.00 - 0.04 K/UL    DF AUTOMATED     METABOLIC PANEL, COMPREHENSIVE    Collection Time: 11/25/21  8:25 PM   Result Value Ref Range    Sodium 141 136 - 145 mmol/L    Potassium 3.7 3.5 - 5.1 mmol/L    Chloride 109 (H) 97 - 108 mmol/L    CO2 28 21 - 32 mmol/L    Anion gap 4 (L) 5 - 15 mmol/L    Glucose 104 (H) 65 - 100 mg/dL    BUN 12 6 - 20 MG/DL    Creatinine 0.74 0.55 - 1.02 MG/DL    BUN/Creatinine ratio 16 12 - 20      GFR est AA >60 >60 ml/min/1.73m2    GFR est non-AA >60 >60 ml/min/1.73m2    Calcium 9.5 8.5 - 10.1 MG/DL    Bilirubin, total 0.4 0.2 - 1.0 MG/DL    ALT (SGPT) 42 12 - 78 U/L    AST (SGOT) 31 15 - 37 U/L    Alk. phosphatase 92 45 - 117 U/L    Protein, total 7.2 6.4 - 8.2 g/dL    Albumin 3.8 3.5 - 5.0 g/dL    Globulin 3.4 2.0 - 4.0 g/dL    A-G Ratio 1.1 1.1 - 2.2     LIPASE    Collection Time: 11/25/21  8:25 PM   Result Value Ref Range    Lipase 78 73 - 393 U/L   URINALYSIS W/ REFLEX CULTURE    Collection Time: 11/25/21  9:46 PM    Specimen: Urine   Result Value Ref Range    Color YELLOW/STRAW      Appearance CLEAR CLEAR      Specific gravity 1.015 1.003 - 1.030      pH (UA) 6.0 5.0 - 8.0      Protein Negative NEG mg/dL    Glucose Negative NEG mg/dL    Ketone Negative NEG mg/dL    Bilirubin Negative NEG      Blood Negative NEG      Urobilinogen 0.2 0.2 - 1.0 EU/dL    Nitrites Negative NEG      Leukocyte Esterase Negative NEG      UA:UC IF INDICATED CULTURE NOT INDICATED BY UA RESULT CNI      WBC 0-4 0 - 4 /hpf    RBC 0-5 0 - 5 /hpf    Epithelial cells FEW FEW /lpf    Bacteria Negative NEG /hpf    Hyaline cast 0-2 0 - 5 /lpf       CT ABD PELV W CONT   Final Result   No acute findings, incidentals as above. CT Results  (Last 48 hours)                 11/25/21 2127  CT ABD PELV W CONT Final result    Impression:  No acute findings, incidentals as above. Narrative:  EXAM: CT ABD PELV W CONT       INDICATION: acute LLQ pain, recent colonoscopy       COMPARISON: October 2018        CONTRAST: 100 mL of [id number]. TECHNIQUE:    Following the uneventful intravenous administration of contrast, thin axial   images were obtained through the abdomen and pelvis. Coronal and sagittal   reconstructions were generated. Oral contrast was not administered. CT dose   reduction was achieved through use of a standardized protocol tailored for this   examination and automatic exposure control for dose modulation. FINDINGS:    LOWER THORAX: No significant abnormality in the incidentally imaged lower chest.   LIVER: No mass. BILIARY TREE: Gallbladder is within normal limits. CBD is not dilated. SPLEEN: within normal limits. PANCREAS: No mass or ductal dilatation. ADRENALS: Unremarkable. KIDNEYS: Bilateral nonobstructing calculi large lower pole left renal cyst   unchanged   STOMACH: Unremarkable. SMALL BOWEL: No dilatation or wall thickening. COLON: No dilatation or wall thickening. Sigmoid diverticulosis   APPENDIX: No significant abnormality   PERITONEUM: No ascites or pneumoperitoneum. RETROPERITONEUM: No lymphadenopathy or aortic aneurysm. REPRODUCTIVE ORGANS: No significant abnormalities   URINARY BLADDER: No mass or calculus. BONES: No destructive bone lesion. ABDOMINAL WALL: No mass or hernia. ADDITIONAL COMMENTS: N/A                 The exam and treatment you received in the Emergency Department were for an urgent problem and are not intended as complete care. It is important that you follow up with a doctor, nurse practitioner, or physician assistant for ongoing care. If your symptoms become worse or you do not improve as expected and you are unable to reach your usual health care provider, you should return to the Emergency Department. We are available 24 hours a day. Please take your discharge instructions with you when you go to your follow-up appointment. If a prescription has been provided, please have it filled as soon as possible to prevent a delay in treatment. Read the entire medication instruction sheet provided to you by the pharmacy.  If you have any questions or reservations about taking the medication due to side effects or interactions with other medications, please call your primary care physician or contact the ER to speak with the charge nurse. Please make an appointment with your family doctor or the physician you were referred to for follow-up of this visit as instructed on your discharge paperwork. Return to the ER if you are unable to be seen or if you are unable to be seen in a timely manner. If you have any problem arranging the follow-up visit, contact the Emergency Department immediately.

## 2021-11-26 NOTE — ED PROVIDER NOTES
EMERGENCY DEPARTMENT HISTORY AND PHYSICAL EXAM      Please note that this dictation was completed with the assistance of \"Dragon\", the computer voice recognition software. Quite often unanticipated grammatical, syntax, homophones, and other interpretive errors are inadvertently transcribed by the computer software. Please disregard these errors and any errors that have escaped final proofreading. Thank you. Patient: Sandrine Lugo  DOS: 21  : 1955  MRN: [id number]  History of Presenting Illness     Chief Complaint   Patient presents with    Abdominal Pain     a week of abdominal pain; has had \"GERD\" since having an endoscopy and colonoscopy. right after she eats she gets a lower abdomen burning and pressure. History Provided By: Patient/family/EMS (if applicable)    HPI: Sandrine Lugo, 76 y.o. female with past medical history significant for long standing GERD presents to the ED with c/o of several weeks of intermittent, moderate intensity burning epigastric pain that has now traveled down to lower abdomen. States she sees Dr. Luana Russo, GI. Pt has recent colonoscopy in 2021 that showed polyps and an EGD that showed a small 3cm hiatal hernia. She has been taking Prilosec without relief and recently switched to [address] [place] [address]. Pt notes pain is worsened after meals. Pt appears very upset by her current sx's. She has tried multiple different PPI's without relief. She believes that the \"acid in her stomach is now burning through her intestine. \" Pt denies any other exacerbating or ameliorating factors. She denies any hemoptysis or weight loss. Additionally, pt specifically denies any recent fever, chills, headache, nausea, vomiting, CP, SOB, lightheadedness, dizziness, numbness, weakness, lower extremity swelling, heart palpitations, urinary sxs, diarrhea, constipation, melena, hematochezia, cough, or congestion.      GI: Dr. Luana Russo     There are no other complaints, changes or physical findings pertinent to the HPI at this time. PCP: Paradise Gabriel MD  Past History   Past Medical History:  Past Medical History:   Diagnosis Date    Anxiety     Depression     Hypercholesteremia     Hypercholesteremia     Hypertension     Pt denies        Past Surgical History:  Past Surgical History:   Procedure Laterality Date    COLONOSCOPY N/A 2021    COLONOSCOPY performed by Lo Grover MD at [facility] ENDOSCOPY    HX BUNIONECTOMY      HX TUBAL LIGATION      HX WISDOM TEETH EXTRACTION         Family History:   Family history reviewed and was non-contributory, unless specified below:  Family History   Problem Relation Age of Onset   Reyes Kidney Disease Mother     Breast Cancer Mother 80    Breast Cancer Maternal Aunt        Social History:  Social History     Tobacco Use    Smoking status: Former Smoker     Packs/day: 1.00     Quit date: 3/12/2008     Years since quittin.7    Smokeless tobacco: Never Used   Vaping Use    Vaping Use: Never used   Substance Use Topics    Alcohol use: Not Currently     Comment: 2 glasses of wine a day    Drug use: No       Allergies: Allergies   Allergen Reactions    Claritin [Loratadine] Other (comments)     Night sweats      Levaquin [Levofloxacin] Other (comments)     Chest pain    Other Medication Other (comments)     Serovent inhalers    Tetracycline Other (comments)     Yeast infx       Current Medications:  No current facility-administered medications on file prior to encounter. Current Outpatient Medications on File Prior to Encounter   Medication Sig Dispense Refill    multivitamin (MULTIPLE VITAMIN PO) Take 1 Tablet by mouth daily.  Omega [id number] 500 mg-500 mcg -1 mg-12.5 mg cap Take 1 Tablet by mouth daily.       diclofenac (VOLTAREN) 1 % gel APPLY 2 INCH RIBBON TOPICAL THREE TIMES A DAY AS NEEDED      dicyclomine (BENTYL) 10 mg capsule TAKE 1 CAPSULE BY MOUTH THREE TIMES A DAY BEFORE MEALS FOR 30 DAYS      omeprazole (PRILOSEC) 20 mg capsule TAKE 1 CAPSULE BY MOUTH TWICE A DAY BEFORE MEALS FOR 30 DAYS (Patient not taking: Reported on 11/11/2021)      levalbuterol tartrate (XOPENEX HFA) 45 mcg/actuation inhaler Take 2 Puffs by inhalation every six (6) hours as needed for Wheezing.  Desvenlafaxine (PRISTIQ) 100 mg Tb24 Take 100 mg by mouth. (Patient not taking: Reported on 8/6/2021)      LORazepam (ATIVAN) 0.5 mg tablet Take 0.5 mg by mouth as needed for Anxiety.  gabapentin (NEURONTIN) 300 mg capsule Take 600 mg by mouth nightly. (Patient not taking: Reported on 10/4/2021)      CALCIUM CITRATE/VITAMIN D3 (CALCIUM CITRATE + PO) Take 400 mg by mouth.  simvastatin (ZOCOR) 20 mg tablet Take  by mouth nightly.  allergy injection        Review of Systems   A complete ROS was reviewed by me today and all other systems negative, unless otherwise specified below:  Review of Systems   Constitutional: Negative for appetite change, chills and fever. HENT: Negative. Negative for congestion and sore throat. Eyes: Negative. Respiratory: Negative. Negative for cough, chest tightness, shortness of breath and wheezing. Cardiovascular: Negative. Negative for chest pain, palpitations and leg swelling. Gastrointestinal: Positive for abdominal pain. Negative for abdominal distention, blood in stool, constipation, diarrhea, nausea and vomiting. Endocrine: Negative. Genitourinary: Negative. Negative for dysuria, flank pain, frequency, hematuria and urgency. Musculoskeletal: Negative. Negative for arthralgias, back pain and myalgias. Skin: Negative. Negative for color change and rash. Neurological: Negative. Negative for dizziness, syncope, speech difficulty, weakness, light-headedness, numbness and headaches. Hematological: Negative. Psychiatric/Behavioral: Negative. Negative for confusion and self-injury. The patient is not nervous/anxious.     All other systems reviewed and are negative. Physical Exam   Physical Exam  Vitals and nursing note reviewed. Constitutional:       General: She is not in acute distress. Appearance: She is well-developed. She is not diaphoretic. Comments: Very tearful about concern situation   HENT:      Head: Normocephalic and atraumatic. Mouth/Throat:      Pharynx: No oropharyngeal exudate. Eyes:      Conjunctiva/sclera: Conjunctivae normal.   Cardiovascular:      Rate and Rhythm: Normal rate and regular rhythm. Heart sounds: Normal heart sounds. Pulmonary:      Effort: Pulmonary effort is normal. No respiratory distress. Breath sounds: Normal breath sounds. No wheezing or rales. Chest:      Chest wall: No tenderness. Abdominal:      General: Bowel sounds are normal. There is no distension. Palpations: Abdomen is soft. There is no mass. Tenderness: There is generalized abdominal tenderness and tenderness in the epigastric area. There is no right CVA tenderness, left CVA tenderness, guarding or rebound. Negative signs include Bowers's sign, Rovsing's sign, McBurney's sign, psoas sign and obturator sign. Musculoskeletal:         General: Normal range of motion. Cervical back: Normal range of motion. Skin:     General: Skin is warm. Neurological:      Mental Status: She is alert and oriented to person, place, and time. Cranial Nerves: No cranial nerve deficit. Motor: No abnormal muscle tone. Psychiatric:         Mood and Affect: Mood is anxious. Affect is tearful. Comments: Appears very tearful and anxious, easily consolable        Diagnostic Study Results     Laboratory Data  I have personally reviewed and interpreted all available laboratory results.    Recent Results (from the past 24 hour(s))   CBC WITH AUTOMATED DIFF    Collection Time: 11/25/21  7:08 PM   Result Value Ref Range    WBC 6.2 3.6 - 11.0 K/uL    RBC 5.05 3.80 - 5.20 M/uL    HGB 14.2 11.5 - 16.0 g/dL    HCT 44.2 35.0 - 47.0 % MCV 87.5 80.0 - 99.0 FL    MCH 28.1 26.0 - 34.0 PG    MCHC 32.1 30.0 - 36.5 g/dL    RDW 14.0 11.5 - 14.5 %    PLATELET 879 875 - 089 K/uL    MPV 9.9 8.9 - 12.9 FL    NRBC 0.0 0  WBC    ABSOLUTE NRBC 0.00 0.00 - 0.01 K/uL    NEUTROPHILS 60 32 - 75 %    LYMPHOCYTES 28 12 - 49 %    MONOCYTES 8 5 - 13 %    EOSINOPHILS 3 0 - 7 %    BASOPHILS 1 0 - 1 %    IMMATURE GRANULOCYTES 0 0.0 - 0.5 %    ABS. NEUTROPHILS 3.8 1.8 - 8.0 K/UL    ABS. LYMPHOCYTES 1.7 0.8 - 3.5 K/UL    ABS. MONOCYTES 0.5 0.0 - 1.0 K/UL    ABS. EOSINOPHILS 0.2 0.0 - 0.4 K/UL    ABS. BASOPHILS 0.1 0.0 - 0.1 K/UL    ABS. IMM. GRANS. 0.0 0.00 - 0.04 K/UL    DF AUTOMATED     METABOLIC PANEL, COMPREHENSIVE    Collection Time: 11/25/21  8:25 PM   Result Value Ref Range    Sodium 141 136 - 145 mmol/L    Potassium 3.7 3.5 - 5.1 mmol/L    Chloride 109 (H) 97 - 108 mmol/L    CO2 28 21 - 32 mmol/L    Anion gap 4 (L) 5 - 15 mmol/L    Glucose 104 (H) 65 - 100 mg/dL    BUN 12 6 - 20 MG/DL    Creatinine 0.74 0.55 - 1.02 MG/DL    BUN/Creatinine ratio 16 12 - 20      GFR est AA >60 >60 ml/min/1.73m2    GFR est non-AA >60 >60 ml/min/1.73m2    Calcium 9.5 8.5 - 10.1 MG/DL    Bilirubin, total 0.4 0.2 - 1.0 MG/DL    ALT (SGPT) 42 12 - 78 U/L    AST (SGOT) 31 15 - 37 U/L    Alk.  phosphatase 92 45 - 117 U/L    Protein, total 7.2 6.4 - 8.2 g/dL    Albumin 3.8 3.5 - 5.0 g/dL    Globulin 3.4 2.0 - 4.0 g/dL    A-G Ratio 1.1 1.1 - 2.2     LIPASE    Collection Time: 11/25/21  8:25 PM   Result Value Ref Range    Lipase 78 73 - 393 U/L   URINALYSIS W/ REFLEX CULTURE    Collection Time: 11/25/21  9:46 PM    Specimen: Urine   Result Value Ref Range    Color YELLOW/STRAW      Appearance CLEAR CLEAR      Specific gravity 1.015 1.003 - 1.030      pH (UA) 6.0 5.0 - 8.0      Protein Negative NEG mg/dL    Glucose Negative NEG mg/dL    Ketone Negative NEG mg/dL    Bilirubin Negative NEG      Blood Negative NEG      Urobilinogen 0.2 0.2 - 1.0 EU/dL    Nitrites Negative NEG Leukocyte Esterase Negative NEG      UA:UC IF INDICATED CULTURE NOT INDICATED BY UA RESULT CNI      WBC 0-4 0 - 4 /hpf    RBC 0-5 0 - 5 /hpf    Epithelial cells FEW FEW /lpf    Bacteria Negative NEG /hpf    Hyaline cast 0-2 0 - 5 /lpf   CBC WITH AUTOMATED DIFF    Collection Time: 11/26/21  4:14 PM   Result Value Ref Range    WBC 6.2 3.6 - 11.0 K/uL    RBC 5.05 3.80 - 5.20 M/uL    HGB 14.4 11.5 - 16.0 g/dL    HCT 42.9 35.0 - 47.0 %    MCV 85.0 80.0 - 99.0 FL    MCH 28.5 26.0 - 34.0 PG    MCHC 33.6 30.0 - 36.5 g/dL    RDW 14.2 11.5 - 14.5 %    PLATELET 357 787 - 269 K/uL    MPV 9.7 8.9 - 12.9 FL    NRBC 0.0 0  WBC    ABSOLUTE NRBC 0.00 0.00 - 0.01 K/uL    NEUTROPHILS 60 32 - 75 %    LYMPHOCYTES 27 12 - 49 %    MONOCYTES 10 5 - 13 %    EOSINOPHILS 2 0 - 7 %    BASOPHILS 1 0 - 1 %    IMMATURE GRANULOCYTES 0 0.0 - 0.5 %    ABS. NEUTROPHILS 3.8 1.8 - 8.0 K/UL    ABS. LYMPHOCYTES 1.7 0.8 - 3.5 K/UL    ABS. MONOCYTES 0.6 0.0 - 1.0 K/UL    ABS. EOSINOPHILS 0.2 0.0 - 0.4 K/UL    ABS. BASOPHILS 0.0 0.0 - 0.1 K/UL    ABS. IMM. GRANS. 0.0 0.00 - 0.04 K/UL    DF AUTOMATED     METABOLIC PANEL, COMPREHENSIVE    Collection Time: 11/26/21  4:14 PM   Result Value Ref Range    Sodium 142 136 - 145 mmol/L    Potassium 3.8 3.5 - 5.1 mmol/L    Chloride 109 (H) 97 - 108 mmol/L    CO2 26 21 - 32 mmol/L    Anion gap 7 5 - 15 mmol/L    Glucose 106 (H) 65 - 100 mg/dL    BUN 9 6 - 20 MG/DL    Creatinine 0.86 0.55 - 1.02 MG/DL    BUN/Creatinine ratio 10 (L) 12 - 20      GFR est AA >60 >60 ml/min/1.73m2    GFR est non-AA >60 >60 ml/min/1.73m2    Calcium 9.7 8.5 - 10.1 MG/DL    Bilirubin, total 0.4 0.2 - 1.0 MG/DL    ALT (SGPT) 43 12 - 78 U/L    AST (SGOT) 29 15 - 37 U/L    Alk.  phosphatase 107 45 - 117 U/L    Protein, total 7.6 6.4 - 8.2 g/dL    Albumin 3.9 3.5 - 5.0 g/dL    Globulin 3.7 2.0 - 4.0 g/dL    A-G Ratio 1.1 1.1 - 2.2         Radiologic Studies   I have personally reviewed and interpreted all available imaging studies and agree with radiology interpretation. CT ABD PELV W CONT   Final Result   No acute findings, incidentals as above. CT Results  (Last 48 hours)               11/25/21 2127  CT ABD PELV W CONT Final result    Impression:  No acute findings, incidentals as above. Narrative:  EXAM: CT ABD PELV W CONT       INDICATION: acute LLQ pain, recent colonoscopy       COMPARISON: October 2018        CONTRAST: 100 mL of Isovue-370. TECHNIQUE:    Following the uneventful intravenous administration of contrast, thin axial   images were obtained through the abdomen and pelvis. Coronal and sagittal   reconstructions were generated. Oral contrast was not administered. CT dose   reduction was achieved through use of a standardized protocol tailored for this   examination and automatic exposure control for dose modulation. FINDINGS:    LOWER THORAX: No significant abnormality in the incidentally imaged lower chest.   LIVER: No mass. BILIARY TREE: Gallbladder is within normal limits. CBD is not dilated. SPLEEN: within normal limits. PANCREAS: No mass or ductal dilatation. ADRENALS: Unremarkable. KIDNEYS: Bilateral nonobstructing calculi large lower pole left renal cyst   unchanged   STOMACH: Unremarkable. SMALL BOWEL: No dilatation or wall thickening. COLON: No dilatation or wall thickening. Sigmoid diverticulosis   APPENDIX: No significant abnormality   PERITONEUM: No ascites or pneumoperitoneum. RETROPERITONEUM: No lymphadenopathy or aortic aneurysm. REPRODUCTIVE ORGANS: No significant abnormalities   URINARY BLADDER: No mass or calculus. BONES: No destructive bone lesion. ABDOMINAL WALL: No mass or hernia. ADDITIONAL COMMENTS: N/A               CXR Results  (Last 48 hours)    None        Medical Decision Making   I am the first and primary ED physician for this patient's ED visit today.     I reviewed our electronic medical record system for any past medical records that may contribute to the patient's current condition, including their past medical history, surgical history, social and family history. This also includes their most recent ED visits, previous hospitalizations and prior diagnostic data. I have reviewed and summarized the most pertinent findings in my HPI and MDM. Vital Signs Reviewed:  Patient Vitals for the past 24 hrs:   Temp Pulse Resp BP SpO2   21     99 %   21  Jennifer Gama (!) 181/95    21 190 97.4 °F (36.3 °C) 77 14 (!) 150/72 100 %     Pulse Oximetry Analysis: 100% on RA    Cardiac Monitor:   Rate: 77 bpm  The cardiac monitor revealed the following rhythm as interpreted by me: Normal Sinus Rhythm  Cardiac monitoring was ordered to monitor patient for signs of cardiac dysrhythmia, which they are at risk for based on their history and/or risk for cardiovascular disease and/or metabolic abnormalities. Records Reviewed: Nursing Notes, Old Medical Records, Previous electrocardiograms, Previous Radiology Studies and Previous Laboratory Studies, EMS reports    Provider Notes (Medical Decision Making):   Pt presents with acute abdominal pain and dyspesia; vital signs stable with currently a non-peritoneal exam; DDx includes: Gastroenteritis, hepatitis, pancreatitis, obstruction, appendicitis, viral illness, IBD, diverticulitis, mesenteric ischemia, AAA or descending dissection, ACS, kidney stone. Will get labs, treat symptomatically and obtain serial abdominal exams to determine if additional imaging is indicated. Will reassess and monitor closely. ED Course:   Initial assessment performed. I discussed presenting problems and concerns, and my formulated plan for today's visit with the patient and any available family members. I have encouraged them to ask questions as they arise throughout the visit.    Social History     Tobacco Use    Smoking status: Former Smoker     Packs/day: 1.00     Quit date: 3/12/2008     Years since quittin.7    Smokeless tobacco: Never Used   Vaping Use    Vaping Use: Never used   Substance Use Topics    Alcohol use: Not Currently     Comment: 2 glasses of wine a day    Drug use: No       ED Orders Placed:  Orders Placed This Encounter    CT ABD PELV W CONT    CBC WITH AUTOMATED DIFF    URINALYSIS W/ REFLEX CULTURE    METABOLIC PANEL, COMPREHENSIVE    LIPASE    maalox/viscous lidocaine (COV GI COCKTAIL)    famotidine (PF) (PEPCID) 20 mg in 0.9% sodium chloride 10 mL injection    iopamidoL (ISOVUE-370) 76 % injection 100 mL    sucralfate (Carafate) 1 gram tablet    lidocaine (Lidocaine Viscous) 2 % solution    dicyclomine (BENTYL) 20 mg tablet       ED Medications Administered During ED Course:  Medications   maalox/viscous lidocaine (COV GI COCKTAIL) (40 mL Oral Given 11/25/21 1952)   famotidine (PF) (PEPCID) 20 mg in 0.9% sodium chloride 10 mL injection (20 mg IntraVENous Given 11/25/21 1948)   iopamidoL (ISOVUE-370) 76 % injection 100 mL (100 mL IntraVENous Given 11/25/21 2127)        Progress Note:  I have just re-evaluated the patient. Patient reports improvement of sx's. I have reviewed Her vital signs and determined there is currently no worsening in their condition or physical exam. Results have been reviewed with them and their questions have been answered. We will continue to review further results as they come available. Progress Note:  I have re-examined the patient. Pt states she feels much better and symptoms improved. Tolerating oral intake. Abdomen is soft and without guarding, rebound or other peritoneal signs. I have discussed with patient the importance of close f/u and to return to the ED if symptoms don't improve or worsen. Progress Note:  Pt reassessed and symptoms noted to have improved significantly after ED treatment. Pt is clinically stable for discharge. Mary Beth Rahs Ninfa's labs and imaging have been reviewed with her and available family.  She verbally conveys understanding and agreement of the signs, symptoms, diagnosis, treatment and prognosis and additionally agrees to follow up as recommended with Dr. Caroline Garza MD and/or specialist as instructed. She agrees with the care plan we have created and conveys that all of her questions have been answered. Additionally, I have put together a packet of discharge instructions for her that include: 1) educational information regarding their diagnosis, 2) how to care for their diagnosis at home, as well a 3) list of reasons why they would want to return to the ED prior to their follow-up appointment should the patient's condition change or symptoms worsen. I have answered all questions to the patient's satisfaction. Strict return precautions given. She conveyed understanding and agreement with care plan. Vital signs stable for discharge. Disposition:  DISCHARGE  The pt is ready for discharge. The pt's signs, symptoms, diagnosis, and discharge instructions have been discussed and pt has conveyed their understanding. The pt is to follow up as recommended or return to ER should their symptoms worsen. Plan has been discussed and pt is in agreement. Plan:  1. Return precautions as discussed with patient and available family/caregiver. 2.   Current Outpatient Medications:     lidocaine (Lidocaine Viscous) 2 % solution, Take 15 mL by mouth as needed for Pain., Disp: 150 mL, Rfl: 0    dicyclomine (BENTYL) 20 mg tablet, Take 1 Tablet by mouth every six to eight (6-8) hours as needed for Abdominal Cramps or Cramping., Disp: 20 Tablet, Rfl: 0    sucralfate (Carafate) 1 gram tablet, Take 1 Tablet by mouth four (4) times daily. , Disp: 20 Tablet, Rfl: 0    multivitamin (MULTIPLE VITAMIN PO), Take 1 Tablet by mouth daily. , Disp: , Rfl:     Omega [id number]-[id number]-[id number] 500 mg-500 mcg -1 mg-12.5 mg cap, Take 1 Tablet by mouth daily. , Disp: , Rfl:     diclofenac (VOLTAREN) 1 % gel, APPLY 2 INCH RIBBON TOPICAL THREE TIMES A DAY AS NEEDED, Disp: , Rfl:     dicyclomine (BENTYL) 10 mg capsule, TAKE 1 CAPSULE BY MOUTH THREE TIMES A DAY BEFORE MEALS FOR 30 DAYS, Disp: , Rfl:     omeprazole (PRILOSEC) 20 mg capsule, TAKE 1 CAPSULE BY MOUTH TWICE A DAY BEFORE MEALS FOR 30 DAYS (Patient not taking: Reported on 11/11/2021), Disp: , Rfl:     levalbuterol tartrate (XOPENEX HFA) 45 mcg/actuation inhaler, Take 2 Puffs by inhalation every six (6) hours as needed for Wheezing., Disp: , Rfl:     Desvenlafaxine (PRISTIQ) 100 mg Tb24, Take 100 mg by mouth. (Patient not taking: Reported on 8/6/2021), Disp: , Rfl:     LORazepam (ATIVAN) 0.5 mg tablet, Take 0.5 mg by mouth as needed for Anxiety. , Disp: , Rfl:     gabapentin (NEURONTIN) 300 mg capsule, Take 600 mg by mouth nightly. (Patient not taking: Reported on 10/4/2021), Disp: , Rfl:     CALCIUM CITRATE/VITAMIN D3 (CALCIUM CITRATE + PO), Take 400 mg by mouth., Disp: , Rfl:     simvastatin (ZOCOR) 20 mg tablet, Take  by mouth nightly., Disp: , Rfl:     allergy injection, , Disp: , Rfl:     3. Follow-up Information     Follow up With Specialties Details Why Contact Info    [facility] EMERGENCY DEPT Emergency Medicine  As needed, If symptoms worsen [address] [place] [address]  [phone]    Jesse Muñoz MD Family Medicine  As needed, If symptoms worsen [facility]  [phone]          Instructed to return to ED if worse  Diagnosis   Clinical Impression:  1. Gastroesophageal reflux disease without esophagitis    2. Acute abdominal pain    3. Accelerated hypertension    4. Sigmoid diverticulosis    5. Anxiety state      Attestation:  Karissa Leyva MD, am the attending of record for this patient. I personally performed the services described in this documentation on this date, 11/25/2021 for patient, Jaime Councilman. I have reviewed the chart and verified that the record is accurate and complete.

## 2021-11-26 NOTE — ED NOTES
Pt ambulatory to room from triage. PT here today with complaints of lower abd pain X2 weeks that is worse after eating. Pt states that the pain is tightness/burning. Pt states that she was having GERD like symptoms and has an endoscopy done a few weeks ago. Pt states that they took some Biopsys of polyps during the endoscopy. PT denies chest pain, sob, n/v/d, fever. Pt ANOX4, respirations even and unlabored, skin warm dry and intact, NAD noted.

## 2021-11-27 NOTE — ED PROVIDER NOTES
EMERGENCY DEPARTMENT HISTORY AND PHYSICAL EXAM      Date: 11/26/2021  Patient Name: Luiz Quinteros    Please note that this dictation was completed with Laquetta Nose, the computer voice recognition software. Quite often unanticipated grammatical, syntax, homophones, and other interpretive errors are inadvertently transcribed by the computer software. Please disregard these errors. Please excuse any errors that have escaped final proofreading. History of Presenting Illness     Chief Complaint   Patient presents with    Medication Problem     Pt arrives in wheelchair to triage with CC of medication reaction she believe to Carafate that was prescribed to her yesterday evening in ED for GI ulcer. Patient reports she took two doses this morning and experienced lighheadesness. Patient then reports taking her Midazolam and now feels \"drugged up\". Patient also reports continued burning in lower stomach. History Provided By: Patient     HPI: Luiz Quinteros, 76 y.o. female, with a past medical history significant for anxiety, GERD presenting the emergency department with complaints of epigastric pain, abdominal pain, near syncope. Patient states that she was seen here yesterday for abdominal pain which she described as burning from her chest down to her pelvis. .  She had CT of the abdomen and pelvis which was unremarkable. She was prescribed Carafate. She states she took the Carafate today twice and after the second dose started having feelings of near syncope. She states that nothing made it better, is made worse by getting up and moving around. No associated shortness of breath or palpitations. Patient denies any recent hematochezia or melena. No other sources of bleeding. She had labs done yesterday which were also unremarkable. PCP: Lanie Adrian MD    No current facility-administered medications on file prior to encounter.      Current Outpatient Medications on File Prior to Encounter   Medication Sig Dispense Refill    lidocaine (Lidocaine Viscous) 2 % solution Take 15 mL by mouth as needed for Pain. 150 mL 0    dicyclomine (BENTYL) 20 mg tablet Take 1 Tablet by mouth every six to eight (6-8) hours as needed for Abdominal Cramps or Cramping. 20 Tablet 0    sucralfate (Carafate) 1 gram tablet Take 1 Tablet by mouth four (4) times daily. 20 Tablet 0    multivitamin (MULTIPLE VITAMIN PO) Take 1 Tablet by mouth daily.  Omega [id number] 500 mg-500 mcg -1 mg-12.5 mg cap Take 1 Tablet by mouth daily.  diclofenac (VOLTAREN) 1 % gel APPLY 2 INCH RIBBON TOPICAL THREE TIMES A DAY AS NEEDED      dicyclomine (BENTYL) 10 mg capsule TAKE 1 CAPSULE BY MOUTH THREE TIMES A DAY BEFORE MEALS FOR 30 DAYS      omeprazole (PRILOSEC) 20 mg capsule TAKE 1 CAPSULE BY MOUTH TWICE A DAY BEFORE MEALS FOR 30 DAYS (Patient not taking: Reported on 11/11/2021)      levalbuterol tartrate (XOPENEX HFA) 45 mcg/actuation inhaler Take 2 Puffs by inhalation every six (6) hours as needed for Wheezing.  Desvenlafaxine (PRISTIQ) 100 mg Tb24 Take 100 mg by mouth. (Patient not taking: Reported on 8/6/2021)      LORazepam (ATIVAN) 0.5 mg tablet Take 0.5 mg by mouth as needed for Anxiety.  gabapentin (NEURONTIN) 300 mg capsule Take 600 mg by mouth nightly. (Patient not taking: Reported on 10/4/2021)      CALCIUM CITRATE/VITAMIN D3 (CALCIUM CITRATE + PO) Take 400 mg by mouth.  simvastatin (ZOCOR) 20 mg tablet Take  by mouth nightly.       allergy injection          Past History     Past Medical History:  Past Medical History:   Diagnosis Date    Anxiety     Depression     Hypercholesteremia     Hypercholesteremia     Hypertension     Pt denies        Past Surgical History:  Past Surgical History:   Procedure Laterality Date    COLONOSCOPY N/A 8/6/2021    [facility] ENDOSCOPY    HX BUNIONECTOMY      HX TUBAL LIGATION      HX WISDOM TEETH EXTRACTION Family History:  Family History   Problem Relation Age of Onset    Kidney Disease Mother     Breast Cancer Mother 80    Breast Cancer Maternal Aunt        Social History:  Social History     Tobacco Use    Smoking status: Former Smoker     Packs/day: 1.00     Quit date: 3/12/2008     Years since quittin.7    Smokeless tobacco: Never Used   Vaping Use    Vaping Use: Never used   Substance Use Topics    Alcohol use: Not Currently     Comment: 2 glasses of wine a day    Drug use: No       Allergies: Allergies   Allergen Reactions    Claritin [Loratadine] Other (comments)     Night sweats      Levaquin [Levofloxacin] Other (comments)     Chest pain    Other Medication Other (comments)     Serovent inhalers    Tetracycline Other (comments)     Yeast infx         Review of Systems   Review of Systems   Constitutional: Negative for chills and fever. HENT: Negative for congestion and sore throat. Eyes: Negative for visual disturbance. Respiratory: Negative for cough and shortness of breath. Cardiovascular: Negative for chest pain and leg swelling. Gastrointestinal: Positive for abdominal pain. Negative for blood in stool, diarrhea, nausea and vomiting. Endocrine: Negative for polyuria. Genitourinary: Negative for dysuria, flank pain, vaginal bleeding and vaginal discharge. Musculoskeletal: Negative for myalgias. Skin: Negative for rash. Allergic/Immunologic: Negative for immunocompromised state. Neurological: Positive for light-headedness. Negative for weakness and headaches. Psychiatric/Behavioral: Negative for confusion. The patient is nervous/anxious. Physical Exam   Physical Exam  Vitals and nursing note reviewed. Constitutional:       Appearance: She is well-developed. Comments: Patient very anxious, crying   HENT:      Head: Normocephalic and atraumatic. Eyes:      General:         Right eye: No discharge. Left eye: No discharge. Conjunctiva/sclera: Conjunctivae normal.      Pupils: Pupils are equal, round, and reactive to light. Neck:      Trachea: No tracheal deviation. Cardiovascular:      Rate and Rhythm: Normal rate and regular rhythm. Heart sounds: Normal heart sounds. No murmur heard. Pulmonary:      Effort: Pulmonary effort is normal. No respiratory distress. Breath sounds: Normal breath sounds. No wheezing or rales. Abdominal:      General: Bowel sounds are normal.      Palpations: Abdomen is soft. Tenderness: There is no abdominal tenderness. There is no guarding or rebound. Musculoskeletal:         General: No tenderness or deformity. Normal range of motion. Cervical back: Normal range of motion and neck supple. Skin:     General: Skin is warm and dry. Findings: No erythema or rash. Neurological:      Mental Status: She is alert and oriented to person, place, and time. Psychiatric:      Comments: Patient is very anxious appearing, writhing her hands, crying         Diagnostic Study Results     Labs -     Recent Results (from the past 12 hour(s))   CBC WITH AUTOMATED DIFF    Collection Time: 11/26/21  4:14 PM   Result Value Ref Range    WBC 6.2 3.6 - 11.0 K/uL    RBC 5.05 3.80 - 5.20 M/uL    HGB 14.4 11.5 - 16.0 g/dL    HCT 42.9 35.0 - 47.0 %    MCV 85.0 80.0 - 99.0 FL    MCH 28.5 26.0 - 34.0 PG    MCHC 33.6 30.0 - 36.5 g/dL    RDW 14.2 11.5 - 14.5 %    PLATELET 369 963 - 888 K/uL    MPV 9.7 8.9 - 12.9 FL    NRBC 0.0 0  WBC    ABSOLUTE NRBC 0.00 0.00 - 0.01 K/uL    NEUTROPHILS 60 32 - 75 %    LYMPHOCYTES 27 12 - 49 %    MONOCYTES 10 5 - 13 %    EOSINOPHILS 2 0 - 7 %    BASOPHILS 1 0 - 1 %    IMMATURE GRANULOCYTES 0 0.0 - 0.5 %    ABS. NEUTROPHILS 3.8 1.8 - 8.0 K/UL    ABS. LYMPHOCYTES 1.7 0.8 - 3.5 K/UL    ABS. MONOCYTES 0.6 0.0 - 1.0 K/UL    ABS. EOSINOPHILS 0.2 0.0 - 0.4 K/UL    ABS. BASOPHILS 0.0 0.0 - 0.1 K/UL    ABS. IMM.  GRANS. 0.0 0.00 - 0.04 K/UL    DF AUTOMATED METABOLIC PANEL, COMPREHENSIVE    Collection Time: 11/26/21  4:14 PM   Result Value Ref Range    Sodium 142 136 - 145 mmol/L    Potassium 3.8 3.5 - 5.1 mmol/L    Chloride 109 (H) 97 - 108 mmol/L    CO2 26 21 - 32 mmol/L    Anion gap 7 5 - 15 mmol/L    Glucose 106 (H) 65 - 100 mg/dL    BUN 9 6 - 20 MG/DL    Creatinine 0.86 0.55 - 1.02 MG/DL    BUN/Creatinine ratio 10 (L) 12 - 20      GFR est AA >60 >60 ml/min/1.73m2    GFR est non-AA >60 >60 ml/min/1.73m2    Calcium 9.7 8.5 - 10.1 MG/DL    Bilirubin, total 0.4 0.2 - 1.0 MG/DL    ALT (SGPT) 43 12 - 78 U/L    AST (SGOT) 29 15 - 37 U/L    Alk. phosphatase 107 45 - 117 U/L    Protein, total 7.6 6.4 - 8.2 g/dL    Albumin 3.9 3.5 - 5.0 g/dL    Globulin 3.7 2.0 - 4.0 g/dL    A-G Ratio 1.1 1.1 - 2.2         Radiologic Studies -   No orders to display     CT Results  (Last 48 hours)               11/25/21 2127  CT ABD PELV W CONT Final result    Impression:  No acute findings, incidentals as above. Narrative:  EXAM: CT ABD PELV W CONT       INDICATION: acute LLQ pain, recent colonoscopy       COMPARISON: October 2018        CONTRAST: 100 mL of Isovue-370. TECHNIQUE:    Following the uneventful intravenous administration of contrast, thin axial   images were obtained through the abdomen and pelvis. Coronal and sagittal   reconstructions were generated. Oral contrast was not administered. CT dose   reduction was achieved through use of a standardized protocol tailored for this   examination and automatic exposure control for dose modulation. FINDINGS:    LOWER THORAX: No significant abnormality in the incidentally imaged lower chest.   LIVER: No mass. BILIARY TREE: Gallbladder is within normal limits. CBD is not dilated. SPLEEN: within normal limits. PANCREAS: No mass or ductal dilatation. ADRENALS: Unremarkable. KIDNEYS: Bilateral nonobstructing calculi large lower pole left renal cyst   unchanged   STOMACH: Unremarkable.    SMALL BOWEL: No dilatation or wall thickening. COLON: No dilatation or wall thickening. Sigmoid diverticulosis   APPENDIX: No significant abnormality   PERITONEUM: No ascites or pneumoperitoneum. RETROPERITONEUM: No lymphadenopathy or aortic aneurysm. REPRODUCTIVE ORGANS: No significant abnormalities   URINARY BLADDER: No mass or calculus. BONES: No destructive bone lesion. ABDOMINAL WALL: No mass or hernia. ADDITIONAL COMMENTS: N/A               CXR Results  (Last 48 hours)    None            Medical Decision Making   I am the first provider for this patient. I reviewed the vital signs, available nursing notes, past medical history, past surgical history, family history and social history. Vital Signs-Reviewed the patient's vital signs. Patient Vitals for the past 12 hrs:   Temp Pulse Resp BP SpO2   11/26/21 1559 97.3 °F (36.3 °C) 80 16 134/87 99 %         Records Reviewed:   Nursing notes, Prior visits     Provider Notes (Medical Decision Making):   Patient evaluated found to be in no acute cardiopulmonary distress. Her labs are unremarkable. When I explained that Carafate is unlikely to cause her feeling of near syncope patient became very upset, began crying, stating that I was not can to help her. I explained that I would try to evaluate her as best I could for an emergent medical condition. She then went on to explain that she is very frustrated due to the fact that her GI symptoms have only beginning worse. She has been followed by Dr. Brina Mohr gastroenterology and feels like she is not getting better with his interventions and therapies and that his work-up thus far has been unrevealing. I advised that the work-up and treatment of the specialist is can to be outside the scope of what we can get done here in the emergency department but if she is unhappy with the care from her current gastroenterologist she could always seek a second opinion    ED Course:   Initial assessment performed.  The patients presenting problems have been discussed, and they are in agreement with the care plan formulated and outlined with them. I have encouraged them to ask questions as they arise throughout their visit. Critical Care Time:   none    Disposition:    DISCHARGE NOTE  Patients results have been reviewed with them. Patient and/or family have verbally conveyed their understanding and agreement of the patient's signs, symptoms, diagnosis, treatment and prognosis and additionally agree to follow up as recommended or return to the Emergency Room should their condition change or have any new concerns prior to their follow-up appointment. Patient verbally agrees with the care-plan and verbally conveys that all of their questions have been answered. Discharge instructions have also been provided to the patient with some educational information regarding their diagnosis as well a list of reasons why they would want to return to the ER prior to their follow-up appointment should their condition change. PLAN:  1. Discharge Medication List as of 11/26/2021  7:29 PM        2. Follow-up Information     Follow up With Specialties Details Why Contact Info    Lanie Adrian MD Family Medicine Schedule an appointment as soon as possible for a visit   [facility] [facility]  [phone]      [facility] G[facility]  Schedule an appointment as soon as possible for a visit   [address]  [address] [facility] [address]    [facility] EMERGENCY DEPT Emergency Medicine  If symptoms worsen [address] [place] [address] Drive  [address] [facility]  [phone]          Return to ED if worse     Diagnosis     Clinical Impression:   1. Abdominal pain, generalized    2. Gastroesophageal reflux disease, unspecified whether esophagitis present        Attestations:   This note was completed by Nahid Cabrera DO

## 2021-12-03 ENCOUNTER — HOSPITAL ENCOUNTER (OUTPATIENT)
Dept: GENERAL RADIOLOGY | Age: 66
Discharge: HOME | End: 2021-12-03
Payer: MEDICARE

## 2021-12-03 ENCOUNTER — TRANSCRIBE ORDER (OUTPATIENT)
Dept: REGISTRATION | Age: 66
End: 2021-12-03

## 2021-12-03 DIAGNOSIS — Z86.010 PERSONAL HISTORY OF COLONIC POLYPS: ICD-10-CM

## 2021-12-03 DIAGNOSIS — K58.2 IRRITABLE BOWEL SYNDROME WITH CONSTIPATION AND DIARRHEA: ICD-10-CM

## 2021-12-03 DIAGNOSIS — F41.9 ANXIETY HYPERVENTILATION: ICD-10-CM

## 2021-12-03 DIAGNOSIS — F45.8 ANXIETY HYPERVENTILATION: ICD-10-CM

## 2021-12-03 DIAGNOSIS — K31.89 GASTROPTOSIS: ICD-10-CM

## 2021-12-03 DIAGNOSIS — F45.8 ANXIETY HYPERVENTILATION: Primary | ICD-10-CM

## 2021-12-03 DIAGNOSIS — F41.9 ANXIETY HYPERVENTILATION: Primary | ICD-10-CM

## 2021-12-03 PROCEDURE — 74018 RADEX ABDOMEN 1 VIEW: CPT

## 2022-02-28 ENCOUNTER — TRANSCRIBE ORDER (OUTPATIENT)
Dept: SCHEDULING | Age: 67
End: 2022-02-28

## 2022-02-28 DIAGNOSIS — Z12.31 SCREENING MAMMOGRAM FOR HIGH-RISK PATIENT: Primary | ICD-10-CM

## 2022-03-18 PROBLEM — K21.9 GASTROESOPHAGEAL REFLUX DISEASE WITHOUT ESOPHAGITIS: Status: ACTIVE | Noted: 2021-10-04

## 2022-03-19 PROBLEM — K44.9 HIATAL HERNIA: Status: ACTIVE | Noted: 2021-10-04

## 2022-04-06 ENCOUNTER — HOSPITAL ENCOUNTER (OUTPATIENT)
Dept: MAMMOGRAPHY | Age: 67
Discharge: HOME | End: 2022-04-06
Attending: FAMILY MEDICINE
Payer: MEDICARE

## 2022-04-06 DIAGNOSIS — Z12.31 SCREENING MAMMOGRAM FOR HIGH-RISK PATIENT: ICD-10-CM

## 2022-04-06 PROCEDURE — 77067 SCR MAMMO BI INCL CAD: CPT

## 2023-01-26 ENCOUNTER — OFFICE VISIT (OUTPATIENT)
Dept: NEUROLOGY | Age: 68
End: 2023-01-26
Payer: MEDICARE

## 2023-01-26 VITALS
HEART RATE: 76 BPM | HEIGHT: 64 IN | SYSTOLIC BLOOD PRESSURE: 138 MMHG | BODY MASS INDEX: 35.17 KG/M2 | TEMPERATURE: 97.6 F | DIASTOLIC BLOOD PRESSURE: 88 MMHG | RESPIRATION RATE: 18 BRPM | WEIGHT: 206 LBS | OXYGEN SATURATION: 97 %

## 2023-01-26 DIAGNOSIS — R55 VASOVAGAL SYNCOPE: ICD-10-CM

## 2023-01-26 DIAGNOSIS — G44.209 TENSION VASCULAR HEADACHE: ICD-10-CM

## 2023-01-26 DIAGNOSIS — I65.23 BILATERAL CAROTID ARTERY STENOSIS: ICD-10-CM

## 2023-01-26 DIAGNOSIS — R55 NEAR SYNCOPE: ICD-10-CM

## 2023-01-26 DIAGNOSIS — R41.3 DISTURBANCE OF MEMORY: Primary | ICD-10-CM

## 2023-01-26 DIAGNOSIS — E53.8 B12 DEFICIENCY: ICD-10-CM

## 2023-01-26 DIAGNOSIS — E55.9 VITAMIN D DEFICIENCY: ICD-10-CM

## 2023-01-26 DIAGNOSIS — E03.4 HYPOTHYROIDISM DUE TO ACQUIRED ATROPHY OF THYROID: ICD-10-CM

## 2023-01-26 PROCEDURE — G8427 DOCREV CUR MEDS BY ELIG CLIN: HCPCS | Performed by: PSYCHIATRY & NEUROLOGY

## 2023-01-26 PROCEDURE — G8417 CALC BMI ABV UP PARAM F/U: HCPCS | Performed by: PSYCHIATRY & NEUROLOGY

## 2023-01-26 PROCEDURE — 3017F COLORECTAL CA SCREEN DOC REV: CPT | Performed by: PSYCHIATRY & NEUROLOGY

## 2023-01-26 PROCEDURE — 1090F PRES/ABSN URINE INCON ASSESS: CPT | Performed by: PSYCHIATRY & NEUROLOGY

## 2023-01-26 PROCEDURE — G8510 SCR DEP NEG, NO PLAN REQD: HCPCS | Performed by: PSYCHIATRY & NEUROLOGY

## 2023-01-26 PROCEDURE — G9899 SCRN MAM PERF RSLTS DOC: HCPCS | Performed by: PSYCHIATRY & NEUROLOGY

## 2023-01-26 PROCEDURE — G8399 PT W/DXA RESULTS DOCUMENT: HCPCS | Performed by: PSYCHIATRY & NEUROLOGY

## 2023-01-26 PROCEDURE — 99204 OFFICE O/P NEW MOD 45 MIN: CPT | Performed by: PSYCHIATRY & NEUROLOGY

## 2023-01-26 PROCEDURE — 1123F ACP DISCUSS/DSCN MKR DOCD: CPT | Performed by: PSYCHIATRY & NEUROLOGY

## 2023-01-26 PROCEDURE — G8536 NO DOC ELDER MAL SCRN: HCPCS | Performed by: PSYCHIATRY & NEUROLOGY

## 2023-01-26 PROCEDURE — 1101F PT FALLS ASSESS-DOCD LE1/YR: CPT | Performed by: PSYCHIATRY & NEUROLOGY

## 2023-01-26 RX ORDER — BENZOCAINE .13; .15; .5; 2 G/100G; G/100G; G/100G; G/100G
2 GEL ORAL DAILY
COMMUNITY

## 2023-01-26 RX ORDER — DESVENLAFAXINE SUCCINATE 50 MG/1
50 TABLET, EXTENDED RELEASE ORAL
COMMUNITY

## 2023-01-26 RX ORDER — DEXLANSOPRAZOLE 60 MG/1
CAPSULE, DELAYED RELEASE ORAL DAILY
COMMUNITY

## 2023-01-26 RX ORDER — FAMOTIDINE 40 MG/1
TABLET, FILM COATED ORAL
COMMUNITY
Start: 2022-10-24

## 2023-01-26 NOTE — LETTER
1/26/2023    Patient: Maira Barajas   YOB: 1955   Date of Visit: 1/26/2023     Rod Abdalla MD  [address] [place] [address]  Via Fax: [phone]    Dear Rod Abdalla MD,      Thank you for referring Ms. Cheng Mc to [address] [place] [address] for evaluation. My notes for this consultation are attached. Consult  REFERRED BY:  Ky Alfonso MD    CHIEF COMPLAINT: Dizziness and lightheadedness and near syncope and increasing headaches and difficulty with mental fog      Subjective:     Maira Barajas is a 75 y.o. right-handed  female seen as a new patient today, at the request of Dr. Yani Shen, for evaluation of new problem of recent episodes of increasing headaches and facial pain and a sensation of feeling that she is going to pass out with lightheadedness and dizziness, and sometimes getting periods where she will have mental fog and cannot concentrate as much. This all started after her allergy shots became changed and apparently was given an allergy shot which she was allergic to has made her allergies worse and she is now getting shots made up. She has seen ENT for her allergies also. She had x-rays of her sinus done in 2010 that were normal.  She has had marked increased drainage recently. She cannot take allergy medication because of side effects. She thinks all her problems are related to her allergy flareup. The episodes of lightheadedness were described more as a presyncopal episode but she never really passed out but did feel lightheaded and faint. She had no chest pain or palpitations. She is very anxious and depressed. She has had no focal weakness sensory loss vision changes or other focal neurologic symptoms. She did seem to have some recent memory problem on her mental status exam but otherwise a relatively unremarkable neuro exam.  She has had no further work-up for this.   She has had no fever, no meningismus no trauma no toxin exposure and no family history of memory problems. Her past medical history is pertinent for anxiety and depression, allergies, arthritis, GERD, latent diabetes, hyperlipidemia, and the allergies. Past Medical History:   Diagnosis Date    Anxiety     Depression     Hearing reduced     Hypercholesteremia     Hypercholesteremia     Hypertension     Pt denies     Memory disorder       Past Surgical History:   Procedure Laterality Date    COLONOSCOPY N/A 2021    [facility] ENDOSCOPY    HX BUNIONECTOMY      HX TUBAL LIGATION      HX WISDOM TEETH EXTRACTION       Family History   Problem Relation Age of Onset    Kidney Disease Mother     Breast Cancer Mother 80    Lung Cancer Father     Diabetes Father     Alcohol abuse Brother     Breast Cancer Maternal Aunt     No Known Problems Other       Social History     Tobacco Use    Smoking status: Former     Packs/day: 1.00     Years: 40.00     Pack years: 40.00     Types: Cigarettes     Quit date: 3/12/2008     Years since quittin.8    Smokeless tobacco: Never   Substance Use Topics    Alcohol use: Not Currently     Alcohol/week: 1.0 standard drink     Types: 1 Glasses of wine per week     Comment: 2 glasses of wine a day         Current Outpatient Medications:     budesonide (RHINOCORT AQUA) 32 mcg/actuation nasal spray, 2 Sprays by Both Nostrils route daily. , Disp: , Rfl:     ubidecarenone/vitamin E mixed (COQ10  PO), Take  by mouth daily. , Disp: , Rfl:     desvenlafaxine succinate (Pristiq) 50 mg ER tablet, Take 50 mg by mouth., Disp: , Rfl:     dexlansoprazole (Dexilant) 60 mg CpDB capsule (delayed release), Take  by mouth daily. , Disp: , Rfl:     vitamin P-S-R-lutein-minerals (OCUVITE) tablet, Take 1 Tablet by mouth daily. , Disp: , Rfl:     famotidine (PEPCID) 40 mg tablet, TAKE 1 TABLET BY MOUTH EVERY DAY AS DIRECTED, Disp: , Rfl:     multivitamin (MULTIPLE VITAMIN PO), Take 1 Tablet by mouth daily. , Disp: , Rfl:     Omega 3-N29-BR-D1-Tfaizsesums 500 mg-500 mcg -1 mg-12.5 mg cap, Take 1 Tablet by mouth daily. , Disp: , Rfl:     diclofenac (VOLTAREN) 1 % gel, APPLY 2 INCH RIBBON TOPICAL THREE TIMES A DAY AS NEEDED, Disp: , Rfl:     dicyclomine (BENTYL) 10 mg capsule, TAKE 1 CAPSULE BY MOUTH THREE TIMES A DAY BEFORE MEALS FOR 30 DAYS, Disp: , Rfl:     levalbuterol tartrate (XOPENEX) 45 mcg/actuation inhaler, Take 2 Puffs by inhalation every six (6) hours as needed for Wheezing., Disp: , Rfl:     LORazepam (ATIVAN) 0.5 mg tablet, Take 0.5 mg by mouth as needed for Anxiety. , Disp: , Rfl:     CALCIUM CITRATE/VITAMIN D3 (CALCIUM CITRATE + PO), Take 400 mg by mouth daily. , Disp: , Rfl:     simvastatin (ZOCOR) 20 mg tablet, Take  by mouth nightly., Disp: , Rfl:     allergy injection, , Disp: , Rfl:         Allergies   Allergen Reactions    Meloxicam Shortness of Breath    Claritin [Loratadine] Other (comments)     Night sweats      Levaquin [Levofloxacin] Other (comments)     Chest pain    Other Medication Other (comments)     Serovent inhalers    Tetracycline Other (comments)     Yeast infx      MRI Results (most recent):  No results found for this or any previous visit. No results found for this or any previous visit.     Review of Systems:  A comprehensive review of systems was negative except for: Constitutional: positive for fatigue and malaise  Ears, nose, mouth, throat, and face: positive for hearing loss, tinnitus, nasal congestion, hoarseness, and allergies  Respiratory: positive for wheezing, dyspnea on exertion, or allergies  Cardiovascular: positive for near-syncope  Gastrointestinal: positive for dyspepsia, reflux symptoms, and nausea  Musculoskeletal: positive for myalgias, arthralgias, and stiff joints  Neurological: positive for headaches, dizziness, and memory problems  Behvioral/Psych: positive for anxiety and depression  Endocrine: positive for latent diabetes and mild neuropathy    Vitals:    01/26/23 0810 01/26/23 0826 01/26/23 0857   BP: (!) 158/88 (!) 154/86 138/88   Pulse: 76     Resp: 18     Temp: 97.6 °F (36.4 °C)     SpO2: 97%     Weight: 206 lb (93.4 kg)     Height: 5' 4\" (1.626 m)       Objective:     I      NEUROLOGICAL EXAM:    Appearance: The patient is well developed, well nourished, provides a coherent history and is in no acute distress. Patient sounds congested and has a lot of allergies   Mental Status: Oriented to time, except the day of the month, place and person, and the president, patient could do serial sevens well, but could not remember 1 of 3 words at 30 seconds, but could spell the word world backward and draw a clock that showed the time 10:50. Cognitive function is possibly abnormal and speech is fluent and no aphasia or dysarthria. Mood and affect appropriate. Cranial Nerves:   Intact visual fields. Fundi are benign, disc are flat, no lesions seen on funduscopy. ELOY, EOM's full, no nystagmus, no ptosis. Facial sensation is normal. Corneal reflexes are not tested. Facial movement is symmetric. Hearing is abnormal bilaterally. Palate is midline with normal sternocleidomastoid and trapezius muscles are normal. Tongue is midline. Neck without meningismus or bruits  Temporal arteries are not tender or enlarged  TMJ areas are not tender on palpation   Motor:  5/5 strength in upper and lower proximal and distal muscles. Normal bulk and tone. No fasciculations. Rapid alternating movement is symmetric and intact bilaterally   Reflexes:   Deep tendon reflexes 2+/4 and symmetrical.  No babinski or clonus present   Sensory:   Normal to touch, pinprick and vibration and temperature. DSS is intact   Gait:  Normal gait for patient's age. Tremor:   No tremor noted.    Cerebellar:  No abnormal cerebellar signs present on Romberg and tandem testing and finger-nose-finger exam.   Neurovascular:  Normal heart sounds and regular rhythm, peripheral pulses decreased, and no carotid bruits. Assessment:       ICD-10-CM ICD-9-CM    1. Disturbance of memory  R41.3 780.93 REFERRAL TO NEUROPSYCHOLOGY      MRI BRAIN W WO CONT      DUPLEX CAROTID BILATERAL      2. Near syncope  R55 780.2 REFERRAL TO NEUROPSYCHOLOGY      MRI BRAIN W WO CONT      DUPLEX CAROTID BILATERAL      3. Tension vascular headache  G44.209 307.81 REFERRAL TO NEUROPSYCHOLOGY      MRI BRAIN W WO CONT      DUPLEX CAROTID BILATERAL      4. B12 deficiency  E53.8 266.2 REFERRAL TO NEUROPSYCHOLOGY      MRI BRAIN W WO CONT      DUPLEX CAROTID BILATERAL      VITAMIN B12 & FOLATE      5. Vitamin D deficiency  E55.9 268.9 REFERRAL TO NEUROPSYCHOLOGY      MRI BRAIN W WO CONT      DUPLEX CAROTID BILATERAL      VITAMIN D, 25 HYDROXY      6. Hypothyroidism due to acquired atrophy of thyroid  E03.4 244.8 REFERRAL TO NEUROPSYCHOLOGY     246.8 MRI BRAIN W WO CONT      DUPLEX CAROTID BILATERAL      TSH 3RD GENERATION      7. Bilateral carotid artery stenosis  I65.23 433.10 REFERRAL TO NEUROPSYCHOLOGY     433.30 MRI BRAIN W WO CONT      DUPLEX CAROTID BILATERAL      8. Vasovagal syncope  R55 780.2 REFERRAL TO NEUROPSYCHOLOGY      MRI BRAIN W WO CONT      DUPLEX CAROTID BILATERAL        Active Problems:    * No active hospital problems. *      Plan:     Patient with problems of some recent memory loss on neuropsych testing, that may be consistent with the normal memory loss of aging or mild cognitive impairment but could even be possibly an early memory loss syndrome. Because of that we will send her to neuropsych testing to see whether she falls out, some of this may even be stress and anxiety from all of her problems and recent allergy flares. Because of her memory loss and the headaches and near syncope we will check an MRI of the brain to make sure there is nothing going on there that could be causing this problem.   Need to rule out dementia, normal pressure hydrocephalus, CNS infection from all her allergies, or microvascular disease of the brain. We will also check a carotid Doppler study to make sure there is no vascular insufficiency causing her near syncope and her memory loss and possibly mini stroke syndrome. She is encouraged to stay mentally and physically active, try to exercise regularly, eat a Mediterranean type diet. She is to her vitamin D B12 and thyroid all checked to rule out treatable causes of memory loss. We discussed her condition with the patient and her  in detail, they are agreeable with plans and therapy and work-up as above. We did advise her that there are medications that might slow the process of her memory loss down likely cognitive enhancing agents Aricept and Namenda. She will check MyChart for results of her test or call us if there is any question, and follow-up will be in 3 to 6 months time or earlier if need be depending on results of her test results clinical course and response to therapy. Signed By: Jossy Way MD     January 26, 2023       CC: Stephanie Brewer MD  FAX: [phone]        If you have questions, please do not hesitate to call me. I look forward to following your patient along with you.       Sincerely,    Jossy Way MD

## 2023-01-26 NOTE — PROGRESS NOTES
Consult  REFERRED BY:  Abdi Lugo MD    CHIEF COMPLAINT: Dizziness and lightheadedness and near syncope and increasing headaches and difficulty with mental fog      Subjective:     March Belpre is a 75 y.o. right-handed  female seen as a new patient today, at the request of Dr. Clara Cueva, for evaluation of new problem of recent episodes of increasing headaches and facial pain and a sensation of feeling that she is going to pass out with lightheadedness and dizziness, and sometimes getting periods where she will have mental fog and cannot concentrate as much. This all started after her allergy shots became changed and apparently was given an allergy shot which she was allergic to has made her allergies worse and she is now getting shots made up. She has seen ENT for her allergies also. She had x-rays of her sinus done in 2010 that were normal.  She has had marked increased drainage recently. She cannot take allergy medication because of side effects. She thinks all her problems are related to her allergy flareup. The episodes of lightheadedness were described more as a presyncopal episode but she never really passed out but did feel lightheaded and faint. She had no chest pain or palpitations. She is very anxious and depressed. She has had no focal weakness sensory loss vision changes or other focal neurologic symptoms. She did seem to have some recent memory problem on her mental status exam but otherwise a relatively unremarkable neuro exam.  She has had no further work-up for this. She has had no fever, no meningismus no trauma no toxin exposure and no family history of memory problems. Her past medical history is pertinent for anxiety and depression, allergies, arthritis, GERD, latent diabetes, hyperlipidemia, and the allergies.     Past Medical History:   Diagnosis Date    Anxiety     Depression     Hearing reduced     Hypercholesteremia     Hypercholesteremia     Hypertension     Pt denies     Memory disorder       Past Surgical History:   Procedure Laterality Date    COLONOSCOPY N/A 2021    COLONOSCOPY performed by Edenilson Valentine MD at [facility] ENDOSCOPY    HX BUNIONECTOMY      HX TUBAL LIGATION      HX WISDOM TEETH EXTRACTION       Family History   Problem Relation Age of Onset    Kidney Disease Mother     Breast Cancer Mother 80    Lung Cancer Father     Diabetes Father     Alcohol abuse Brother     Breast Cancer Maternal Aunt     No Known Problems Other       Social History     Tobacco Use    Smoking status: Former     Packs/day: 1.00     Years: 40.00     Pack years: 40.00     Types: Cigarettes     Quit date: 3/12/2008     Years since quittin.8    Smokeless tobacco: Never   Substance Use Topics    Alcohol use: Not Currently     Alcohol/week: 1.0 standard drink     Types: 1 Glasses of wine per week     Comment: 2 glasses of wine a day         Current Outpatient Medications:     budesonide (RHINOCORT AQUA) 32 mcg/actuation nasal spray, 2 Sprays by Both Nostrils route daily. , Disp: , Rfl:     ubidecarenone/vitamin E mixed (COQ10  PO), Take  by mouth daily. , Disp: , Rfl:     desvenlafaxine succinate (Pristiq) 50 mg ER tablet, Take 50 mg by mouth., Disp: , Rfl:     dexlansoprazole (Dexilant) 60 mg CpDB capsule (delayed release), Take  by mouth daily. , Disp: , Rfl:     vitamin Q-K-X-lutein-minerals (OCUVITE) tablet, Take 1 Tablet by mouth daily. , Disp: , Rfl:     famotidine (PEPCID) 40 mg tablet, TAKE 1 TABLET BY MOUTH EVERY DAY AS DIRECTED, Disp: , Rfl:     multivitamin (MULTIPLE VITAMIN PO), Take 1 Tablet by mouth daily. , Disp: , Rfl:     Omega 3-H22-KP-B7-Xxzhqqezvie 500 mg-500 mcg -1 mg-12.5 mg cap, Take 1 Tablet by mouth daily. , Disp: , Rfl:     diclofenac (VOLTAREN) 1 % gel, APPLY 2 INCH RIBBON TOPICAL THREE TIMES A DAY AS NEEDED, Disp: , Rfl:     dicyclomine (BENTYL) 10 mg capsule, TAKE 1 CAPSULE BY MOUTH THREE TIMES A DAY BEFORE MEALS FOR 30 DAYS, Disp: , Rfl: levalbuterol tartrate (XOPENEX) 45 mcg/actuation inhaler, Take 2 Puffs by inhalation every six (6) hours as needed for Wheezing., Disp: , Rfl:     LORazepam (ATIVAN) 0.5 mg tablet, Take 0.5 mg by mouth as needed for Anxiety. , Disp: , Rfl:     CALCIUM CITRATE/VITAMIN D3 (CALCIUM CITRATE + PO), Take 400 mg by mouth daily. , Disp: , Rfl:     simvastatin (ZOCOR) 20 mg tablet, Take  by mouth nightly., Disp: , Rfl:     allergy injection, , Disp: , Rfl:         Allergies   Allergen Reactions    Meloxicam Shortness of Breath    Claritin [Loratadine] Other (comments)     Night sweats      Levaquin [Levofloxacin] Other (comments)     Chest pain    Other Medication Other (comments)     Serovent inhalers    Tetracycline Other (comments)     Yeast infx      MRI Results (most recent):  No results found for this or any previous visit. No results found for this or any previous visit. Review of Systems:  A comprehensive review of systems was negative except for: Constitutional: positive for fatigue and malaise  Ears, nose, mouth, throat, and face: positive for hearing loss, tinnitus, nasal congestion, hoarseness, and allergies  Respiratory: positive for wheezing, dyspnea on exertion, or allergies  Cardiovascular: positive for near-syncope  Gastrointestinal: positive for dyspepsia, reflux symptoms, and nausea  Musculoskeletal: positive for myalgias, arthralgias, and stiff joints  Neurological: positive for headaches, dizziness, and memory problems  Behvioral/Psych: positive for anxiety and depression  Endocrine: positive for latent diabetes and mild neuropathy    Vitals:    01/26/23 0810 01/26/23 0826 01/26/23 0857   BP: (!) 158/88 (!) 154/86 138/88   Pulse: 76     Resp: 18     Temp: 97.6 °F (36.4 °C)     SpO2: 97%     Weight: 206 lb (93.4 kg)     Height: 5' 4\" (1.626 m)       Objective:     I      NEUROLOGICAL EXAM:    Appearance:   The patient is well developed, well nourished, provides a coherent history and is in no acute distress. Patient sounds congested and has a lot of allergies   Mental Status: Oriented to time, except the day of the month, place and person, and the president, patient could do serial sevens well, but could not remember 1 of 3 words at 30 seconds, but could spell the word world backward and draw a clock that showed the time 10:50. Cognitive function is possibly abnormal and speech is fluent and no aphasia or dysarthria. Mood and affect appropriate. Cranial Nerves:   Intact visual fields. Fundi are benign, disc are flat, no lesions seen on funduscopy. ELOY, EOM's full, no nystagmus, no ptosis. Facial sensation is normal. Corneal reflexes are not tested. Facial movement is symmetric. Hearing is abnormal bilaterally. Palate is midline with normal sternocleidomastoid and trapezius muscles are normal. Tongue is midline. Neck without meningismus or bruits  Temporal arteries are not tender or enlarged  TMJ areas are not tender on palpation   Motor:  5/5 strength in upper and lower proximal and distal muscles. Normal bulk and tone. No fasciculations. Rapid alternating movement is symmetric and intact bilaterally   Reflexes:   Deep tendon reflexes 2+/4 and symmetrical.  No babinski or clonus present   Sensory:   Normal to touch, pinprick and vibration and temperature. DSS is intact   Gait:  Normal gait for patient's age. Tremor:   No tremor noted. Cerebellar:  No abnormal cerebellar signs present on Romberg and tandem testing and finger-nose-finger exam.   Neurovascular:  Normal heart sounds and regular rhythm, peripheral pulses decreased, and no carotid bruits. Assessment:       ICD-10-CM ICD-9-CM    1. Disturbance of memory  R41.3 780.93 REFERRAL TO NEUROPSYCHOLOGY      MRI BRAIN W WO CONT      DUPLEX CAROTID BILATERAL      2. Near syncope  R55 780.2 REFERRAL TO NEUROPSYCHOLOGY      MRI BRAIN W WO CONT      DUPLEX CAROTID BILATERAL      3.  Tension vascular headache  G44.209 307.81 REFERRAL TO NEUROPSYCHOLOGY      MRI BRAIN W WO CONT      DUPLEX CAROTID BILATERAL      4. B12 deficiency  E53.8 266.2 REFERRAL TO NEUROPSYCHOLOGY      MRI BRAIN W WO CONT      DUPLEX CAROTID BILATERAL      VITAMIN B12 & FOLATE      5. Vitamin D deficiency  E55.9 268.9 REFERRAL TO NEUROPSYCHOLOGY      MRI BRAIN W WO CONT      DUPLEX CAROTID BILATERAL      VITAMIN D, 25 HYDROXY      6. Hypothyroidism due to acquired atrophy of thyroid  E03.4 244.8 REFERRAL TO NEUROPSYCHOLOGY     246.8 MRI BRAIN W WO CONT      DUPLEX CAROTID BILATERAL      TSH 3RD GENERATION      7. Bilateral carotid artery stenosis  I65.23 433.10 REFERRAL TO NEUROPSYCHOLOGY     433.30 MRI BRAIN W WO CONT      DUPLEX CAROTID BILATERAL      8. Vasovagal syncope  R55 780.2 REFERRAL TO NEUROPSYCHOLOGY      MRI BRAIN W WO CONT      DUPLEX CAROTID BILATERAL        Active Problems:    * No active hospital problems. *      Plan:     Patient with problems of some recent memory loss on neuropsych testing, that may be consistent with the normal memory loss of aging or mild cognitive impairment but could even be possibly an early memory loss syndrome. Because of that we will send her to neuropsych testing to see whether she falls out, some of this may even be stress and anxiety from all of her problems and recent allergy flares. Because of her memory loss and the headaches and near syncope we will check an MRI of the brain to make sure there is nothing going on there that could be causing this problem. Need to rule out dementia, normal pressure hydrocephalus, CNS infection from all her allergies, or microvascular disease of the brain. We will also check a carotid Doppler study to make sure there is no vascular insufficiency causing her near syncope and her memory loss and possibly mini stroke syndrome. She is encouraged to stay mentally and physically active, try to exercise regularly, eat a Mediterranean type diet.   She is to her vitamin D B12 and thyroid all checked to rule out treatable causes of memory loss. We discussed her condition with the patient and her  in detail, they are agreeable with plans and therapy and work-up as above. We did advise her that there are medications that might slow the process of her memory loss down likely cognitive enhancing agents Aricept and Namenda. She will check MyChart for results of her test or call us if there is any question, and follow-up will be in 3 to 6 months time or earlier if need be depending on results of her test results clinical course and response to therapy. Time spent with the patient and her  going over this exam in detail and going over her history, discussing her diagnosis prognosis and treatment options was 54 minutes.     Signed By: Berenice Rudd MD     January 26, 2023       CC: Zachary Gibbons MD  FAX: [phone]

## 2023-03-14 ENCOUNTER — TRANSCRIBE ORDER (OUTPATIENT)
Dept: SCHEDULING | Age: 68
End: 2023-03-14

## 2023-03-14 DIAGNOSIS — Z12.31 VISIT FOR SCREENING MAMMOGRAM: Primary | ICD-10-CM

## 2023-04-18 ENCOUNTER — HOSPITAL ENCOUNTER (OUTPATIENT)
Dept: ULTRASOUND IMAGING | Age: 68
Discharge: HOME | End: 2023-04-18
Attending: FAMILY MEDICINE
Payer: MEDICARE

## 2023-04-18 ENCOUNTER — HOSPITAL ENCOUNTER (OUTPATIENT)
Dept: MAMMOGRAPHY | Age: 68
Discharge: HOME | End: 2023-04-18
Attending: FAMILY MEDICINE
Payer: MEDICARE

## 2023-04-18 DIAGNOSIS — R92.8 ABNORMAL MAMMOGRAM: ICD-10-CM

## 2023-04-18 PROCEDURE — 77061 BREAST TOMOSYNTHESIS UNI: CPT

## 2023-04-23 DIAGNOSIS — R92.8 ABNORMAL MAMMOGRAM: Primary | ICD-10-CM

## 2023-04-23 DIAGNOSIS — Z12.31 VISIT FOR SCREENING MAMMOGRAM: Primary | ICD-10-CM

## 2023-09-08 ENCOUNTER — TELEPHONE (OUTPATIENT)
Age: 68
End: 2023-09-08

## 2023-09-08 NOTE — TELEPHONE ENCOUNTER
Pt's , Louie Rivera, called stating that Pt does not wish to keep this appt and does not want to r/s. Asked that we please cancel her appt on 9/28.

## 2024-04-02 ENCOUNTER — TRANSCRIBE ORDERS (OUTPATIENT)
Facility: HOSPITAL | Age: 69
End: 2024-04-02

## 2024-04-02 DIAGNOSIS — Z12.31 VISIT FOR SCREENING MAMMOGRAM: Primary | ICD-10-CM

## 2024-04-13 ENCOUNTER — HOSPITAL ENCOUNTER (OUTPATIENT)
Facility: HOSPITAL | Age: 69
End: 2024-04-13
Payer: MEDICARE

## 2024-04-13 VITALS — WEIGHT: 206 LBS | HEIGHT: 64 IN | BODY MASS INDEX: 35.17 KG/M2

## 2024-04-13 DIAGNOSIS — Z12.31 VISIT FOR SCREENING MAMMOGRAM: ICD-10-CM

## 2024-04-13 PROCEDURE — 77063 BREAST TOMOSYNTHESIS BI: CPT

## 2024-08-20 RX ORDER — FEXOFENADINE HCL 180 MG/1
180 TABLET ORAL DAILY
COMMUNITY

## 2024-08-21 ENCOUNTER — ANESTHESIA (OUTPATIENT)
Facility: HOSPITAL | Age: 69
End: 2024-08-21
Payer: MEDICARE

## 2024-08-21 ENCOUNTER — ANESTHESIA EVENT (OUTPATIENT)
Facility: HOSPITAL | Age: 69
End: 2024-08-21
Payer: MEDICARE

## 2024-08-21 ENCOUNTER — HOSPITAL ENCOUNTER (OUTPATIENT)
Facility: HOSPITAL | Age: 69
Setting detail: OUTPATIENT SURGERY
Discharge: HOME | End: 2024-08-21
Attending: SPECIALIST | Admitting: SPECIALIST
Payer: MEDICARE

## 2024-08-21 VITALS
WEIGHT: 207.6 LBS | HEART RATE: 73 BPM | SYSTOLIC BLOOD PRESSURE: 128 MMHG | OXYGEN SATURATION: 97 % | BODY MASS INDEX: 35.44 KG/M2 | RESPIRATION RATE: 19 BRPM | HEIGHT: 64 IN | TEMPERATURE: 98 F | DIASTOLIC BLOOD PRESSURE: 69 MMHG

## 2024-08-21 PROCEDURE — 7100000011 HC PHASE II RECOVERY - ADDTL 15 MIN: Performed by: SPECIALIST

## 2024-08-21 PROCEDURE — 3700000001 HC ADD 15 MINUTES (ANESTHESIA): Performed by: SPECIALIST

## 2024-08-21 PROCEDURE — 2580000003 HC RX 258: Performed by: SPECIALIST

## 2024-08-21 PROCEDURE — 2709999900 HC NON-CHARGEABLE SUPPLY: Performed by: SPECIALIST

## 2024-08-21 PROCEDURE — 88305 TISSUE EXAM BY PATHOLOGIST: CPT

## 2024-08-21 PROCEDURE — 3700000000 HC ANESTHESIA ATTENDED CARE: Performed by: SPECIALIST

## 2024-08-21 PROCEDURE — 7100000010 HC PHASE II RECOVERY - FIRST 15 MIN: Performed by: SPECIALIST

## 2024-08-21 PROCEDURE — 2500000003 HC RX 250 WO HCPCS: Performed by: ANESTHESIOLOGY

## 2024-08-21 PROCEDURE — 6360000002 HC RX W HCPCS: Performed by: ANESTHESIOLOGY

## 2024-08-21 PROCEDURE — 3600007512: Performed by: SPECIALIST

## 2024-08-21 PROCEDURE — 3600007502: Performed by: SPECIALIST

## 2024-08-21 RX ORDER — LIDOCAINE HYDROCHLORIDE 20 MG/ML
INJECTION, SOLUTION EPIDURAL; INFILTRATION; INTRACAUDAL; PERINEURAL PRN
Status: DISCONTINUED | OUTPATIENT
Start: 2024-08-21 | End: 2024-08-21 | Stop reason: SDUPTHER

## 2024-08-21 RX ORDER — SODIUM CHLORIDE 0.9 % (FLUSH) 0.9 %
5-40 SYRINGE (ML) INJECTION EVERY 12 HOURS SCHEDULED
Status: DISCONTINUED | OUTPATIENT
Start: 2024-08-21 | End: 2024-08-21 | Stop reason: HOSPADM

## 2024-08-21 RX ORDER — SODIUM CHLORIDE 0.9 % (FLUSH) 0.9 %
5-40 SYRINGE (ML) INJECTION PRN
Status: DISCONTINUED | OUTPATIENT
Start: 2024-08-21 | End: 2024-08-21 | Stop reason: HOSPADM

## 2024-08-21 RX ORDER — SODIUM CHLORIDE 9 MG/ML
25 INJECTION, SOLUTION INTRAVENOUS PRN
Status: DISCONTINUED | OUTPATIENT
Start: 2024-08-21 | End: 2024-08-21 | Stop reason: HOSPADM

## 2024-08-21 RX ADMIN — LIDOCAINE HYDROCHLORIDE 40 MG: 20 INJECTION, SOLUTION EPIDURAL; INFILTRATION; INTRACAUDAL; PERINEURAL at 14:30

## 2024-08-21 RX ADMIN — PROPOFOL 300 MG: 10 INJECTION, EMULSION INTRAVENOUS at 14:49

## 2024-08-21 RX ADMIN — SODIUM CHLORIDE 25 ML: 9 INJECTION, SOLUTION INTRAVENOUS at 13:51

## 2024-08-21 ASSESSMENT — PAIN - FUNCTIONAL ASSESSMENT: PAIN_FUNCTIONAL_ASSESSMENT: 0-10

## 2024-08-21 NOTE — ANESTHESIA POSTPROCEDURE EVALUATION
Department of Anesthesiology  Postprocedure Note    Patient: Mel Grewal  MRN: [id number]  YOB: 1955  Date of evaluation: 8/21/2024    Procedure Summary       Date: 08/21/24 Room / Location: [facility] 01 / [facility] ENDOSCOPY    Anesthesia Start: 1425 Anesthesia Stop: 1451    Procedure: COLONOSCOPY Diagnosis:       Personal history of colonic polyps      Change in bowel habits      Chronic idiopathic constipation      (Personal history of colonic polyps [Z86.010])      (Change in bowel habits [R19.4])      (Chronic idiopathic constipation [K59.04])    Surgeons: Milo Rossi MD Responsible Provider: Tamra Rdz DO    Anesthesia Type: TIVA ASA Status: 3            Anesthesia Type: TIVA    Breanna Phase I: Breanna Score: 10    Breanna Phase II: Breanna Score: 10    Anesthesia Post Evaluation    Patient location during evaluation: PACU  Patient participation: complete - patient participated  Level of consciousness: awake  Airway patency: patent  Nausea & Vomiting: no vomiting and no nausea  Cardiovascular status: hemodynamically stable  Respiratory status: acceptable  Hydration status: euvolemic    No notable events documented.

## 2024-08-21 NOTE — OP NOTE
Colonoscopy Procedure Note    Indications:   Personal history of colon polyps (screening only)    Referring Physician: Yonis Carrasquillo MD  Anesthesia/Sedation: MAC anesthesia Propofol  Endoscopist:  Dr. Milo Rossi    Procedure in Detail:  Informed consent was obtained for the procedure, including sedation.  Risks of perforation, hemorrhage, adverse drug reaction, and aspiration were discussed. The patient was placed in the left lateral decubitus position.  Based on the pre-procedure assessment, including review of the patient's medical history, medications, allergies, and review of systems, she had been deemed to be an appropriate candidate for moderate sedation; she was therefore sedated with the medications listed above.   The patient was monitored continuously with ECG tracing, pulse oximetry, blood pressure monitoring, and direct observations.      A rectal examination was performed. The [id number] was inserted into the rectum and advanced under direct vision to the cecum, which was identified by the ileocecal valve and appendiceal orifice.  The quality of the colonic preparation was fair.  A careful inspection was made as the colonoscope was withdrawn, including a retroflexed view of the rectum; findings and interventions are described below.  Appropriate photodocumentation was obtained.    Findings:    Scope advanced to the cecum.   Preparation was fair with scattered liquid stool throughout.   Diffuse moderately severe diverticulosis in the sigmoid and descending colon.   Sessile 4 mm polyp in transverse colon s/p cold snare removal.   Small internal hemorrhoids.    Therapies:  see above    Specimen: Specimens were collected as described above and sent to pathology.     Complications: None were encountered during the procedure.     EBL: < 10 ml.    Recommendations:     - Repeat colonoscopy in 5 years.    Signed By: Milo Rossi MD                        August 21, 2024

## 2024-08-21 NOTE — ANESTHESIA PRE PROCEDURE
Department of Anesthesiology  Preprocedure Note       Name:  Mel Grewal   Age:  68 y.o.  :  1955                                          MRN:  [id number]         Date:  2024      Surgeon: Surgeon(s):  Milo Rossi MD    Procedure: Procedure(s):  COLONOSCOPY    Medications prior to admission:   Prior to Admission medications    Medication Sig Start Date End Date Taking? Authorizing Provider   fexofenadine (ALLEGRA ALLERGY) 180 MG tablet Take 1 tablet by mouth daily   Yes Provider, MD Portia   budesonide (RINOCORT AQUA) 32 MCG/ACT nasal spray 2 sprays by Nasal route daily   Yes Automatic Reconciliation, Ar   desvenlafaxine succinate (PRISTIQ) 50 MG TB24 extended release tablet Take 1 tablet by mouth daily   Yes Automatic Reconciliation, Ar   dexlansoprazole (DEXILANT) 60 MG CPDR delayed release capsule Take 1 capsule by mouth daily   Yes Automatic Reconciliation, Ar   simvastatin (ZOCOR) 20 MG tablet Take 1 tablet by mouth nightly   Yes Automatic Reconciliation, Ar   diclofenac sodium (VOLTAREN) 1 % GEL APPLY 2 INCH RIBBON TOPICAL THREE TIMES A DAY AS NEEDED 21   Automatic Reconciliation, Ar   dicyclomine (BENTYL) 10 MG capsule Take 1 capsule by mouth 3 times daily as needed 21   Automatic Reconciliation, Ar   famotidine (PEPCID) 40 MG tablet Take 1 tablet by mouth daily as needed 10/24/22   Automatic Reconciliation, Ar   levalbuterol (XOPENEX HFA) 45 MCG/ACT inhaler Inhale 2 puffs into the lungs every 6 hours as needed    Automatic Reconciliation, Ar   LORazepam (ATIVAN) 0.5 MG tablet Take 1 tablet by mouth as needed.    Automatic Reconciliation, Ar       Current medications:    No current facility-administered medications for this encounter.     Current Outpatient Medications   Medication Sig Dispense Refill    fexofenadine (ALLEGRA ALLERGY) 180 MG tablet Take 1 tablet by mouth daily      budesonide (RINOCORT AQUA) 32 MCG/ACT nasal spray 2 sprays by Nasal route daily

## 2024-08-21 NOTE — DISCHARGE INSTRUCTIONS
Mel S Carmina  [id number]  1955    COLON DISCHARGE INSTRUCTIONS  Discomfort:  Redness at IV site- apply warm compress to area; if redness or soreness persist- contact your physician  There may be a slight amount of blood passed from the rectum  Gaseous discomfort- walking, belching will help relieve any discomfort  You may not operate a vehicle for 12 hours  You may not engage in an occupation involving machinery or appliances for rest of today  You may not drink alcoholic beverages for at least 12 hours  Avoid making any critical decisions for at least 24 hour  DIET:   Regular diet.   - however -  remember your colon is empty and a heavy meal will produce gas.   Avoid these foods:  vegetables, fried / greasy foods, carbonated drinks for today.    MEDICATIONS:        Regarding Aspirin or Nonsteroidal medications, please see below.    ACTIVITY:  You may resume your normal daily activities it is recommended that you spend the remainder of the day resting -  avoid any strenuous activity.    CALL M.D.  ANY SIGN OF:  Increasing pain, nausea, vomiting  Abdominal distension (swelling)  New increased bleeding (oral or rectal)  Fever (chills)  Pain in chest area  Bloody discharge from nose or mouth  Shortness of breath  Tylenol as needed for pain.      Follow-up Instructions:   Call Dr. Rossi for results of procedure / biopsy in 4-5 days at telephone #  [phone]              Repeat Colonoscopy in 5 years    Findings:  Colon polyp: removed  Diverticulosis    Patient Education on Sedation / Analgesia Administered for Procedure      For 24 hours after general anesthesia or intravenous analgesia / sedation:  Have someone responsible help you with your care  Limit your activities  Do not drive and operate hazardous machinery  Do not make important personal, legal or business decisions  Do not drink alcoholic beverages  If you have not urinated within 8 hours after discharge, please contact your physician  Resume your

## 2024-08-21 NOTE — PROGRESS NOTES
ARRIVAL INFORMATION:  Verified patient name and date of birth, scheduled procedure, and informed consent.     : Gonzalez altamirano    contact number: [phone]  Physician and staff can share information with the .     Belongings with patient include:  Clothing, purse left with family in lobby    GI FOCUSED ASSESSMENT:  Neuro: Awake, alert, oriented x4  Respiratory: even and unlabored   GI: soft and non-distended  EKG Rhythm: normal sinus rhythm    Education:Reviewed general discharge instructions and  information.

## 2024-08-27 NOTE — H&P
Gastroenterology Outpatient History and Physical    Patient: Mel Crumpsallydarnell    Physician: Milo Rossi MD    Vital Signs: Blood pressure 128/69, pulse 73, temperature 98 °F (36.7 °C), temperature source Tympanic, resp. rate 19, height 1.626 m (5' 4\"), weight 94.2 kg (207 lb 9.6 oz), SpO2 97%.    Allergies:   Allergies   Allergen Reactions    Meloxicam Shortness Of Breath    Levofloxacin Other (See Comments)     Chest pain    Loratadine Other (See Comments)     Night sweats    Tetracycline Other (See Comments)     Yeast infx       Chief Complaint: Personal h/o colon polyps    History of Present Illness: above    Justification for Procedure: above    History:  Past Medical History:   Diagnosis Date    Anxiety     Asthma     Depression     Hearing reduced     Hypercholesteremia     Hypertension     Pt denies     Memory disorder       Past Surgical History:   Procedure Laterality Date    BUNIONECTOMY Right     COLONOSCOPY N/A 2021    COLONOSCOPY performed by Milo Rossi MD at \[facility]\"" ENDOSCOPY    COLONOSCOPY N/A 2024    COLONOSCOPY performed by Milo Rossi MD at \[facility]\"" ENDOSCOPY    TUBAL LIGATION      WISDOM TOOTH EXTRACTION        Social History     Socioeconomic History    Marital status:      Spouse name: None    Number of children: None    Years of education: None    Highest education level: None   Tobacco Use    Smoking status: Former     Current packs/day: 0.00     Types: Cigarettes     Quit date: 3/12/2008     Years since quittin.4    Smokeless tobacco: Never   Vaping Use    Vaping status: Never Used   Substance and Sexual Activity    Alcohol use: Not Currently     Alcohol/week: 1.0 standard drink of alcohol    Drug use: No      Family History   Problem Relation Age of Onset    Heart Disease Mother     Breast Cancer Mother 84    Diabetes Father     Lung Cancer Father     Alcohol Abuse Brother     Breast Cancer Maternal Aunt     No Known Problems Other        Medications:   Prior

## 2025-01-03 ENCOUNTER — TRANSCRIBE ORDERS (OUTPATIENT)
Facility: HOSPITAL | Age: 70
End: 2025-01-03

## 2025-01-03 DIAGNOSIS — Z87.891 FORMER SMOKER: Primary | ICD-10-CM

## 2025-01-03 DIAGNOSIS — Z00.8 HEALTH EXAMINATION IN POPULATION SURVEY: ICD-10-CM

## 2025-01-03 DIAGNOSIS — R06.89 DIFFICULTY BREATHING: ICD-10-CM

## 2025-01-03 DIAGNOSIS — J30.89 ALLERGIC RHINITIS DUE TO OTHER ALLERGIC TRIGGER, UNSPECIFIED SEASONALITY: ICD-10-CM

## 2025-01-03 DIAGNOSIS — R09.81 CHRONIC NASAL CONGESTION: ICD-10-CM

## 2025-01-14 ENCOUNTER — HOSPITAL ENCOUNTER (OUTPATIENT)
Facility: HOSPITAL | Age: 70
Discharge: HOME | End: 2025-01-17
Payer: MEDICARE

## 2025-01-14 DIAGNOSIS — R06.89 DIFFICULTY BREATHING: ICD-10-CM

## 2025-01-14 DIAGNOSIS — Z00.8 HEALTH EXAMINATION IN POPULATION SURVEY: ICD-10-CM

## 2025-01-14 DIAGNOSIS — R09.81 CHRONIC NASAL CONGESTION: ICD-10-CM

## 2025-01-14 DIAGNOSIS — J30.89 ALLERGIC RHINITIS DUE TO OTHER ALLERGIC TRIGGER, UNSPECIFIED SEASONALITY: ICD-10-CM

## 2025-01-14 DIAGNOSIS — Z87.891 FORMER SMOKER: ICD-10-CM

## 2025-01-14 LAB — CREAT BLD-MCNC: 0.9 MG/DL (ref 0.6–1.3)

## 2025-01-14 PROCEDURE — 70491 CT SOFT TISSUE NECK W/DYE: CPT

## 2025-01-14 PROCEDURE — 6360000004 HC RX CONTRAST MEDICATION: Performed by: NURSE PRACTITIONER

## 2025-01-14 PROCEDURE — 82565 ASSAY OF CREATININE: CPT

## 2025-01-14 RX ORDER — IOPAMIDOL 755 MG/ML
100 INJECTION, SOLUTION INTRAVASCULAR
Status: COMPLETED | OUTPATIENT
Start: 2025-01-14 | End: 2025-01-14

## 2025-01-14 RX ADMIN — IOPAMIDOL 100 ML: 755 INJECTION, SOLUTION INTRAVENOUS at 13:38

## 2025-04-01 ENCOUNTER — TRANSCRIBE ORDERS (OUTPATIENT)
Facility: HOSPITAL | Age: 70
End: 2025-04-01

## 2025-04-01 DIAGNOSIS — Z12.31 OTHER SCREENING MAMMOGRAM: Primary | ICD-10-CM

## 2025-05-13 ENCOUNTER — HOSPITAL ENCOUNTER (OUTPATIENT)
Facility: HOSPITAL | Age: 70
Discharge: HOME | End: 2025-05-16
Payer: MEDICARE

## 2025-05-13 DIAGNOSIS — Z12.31 OTHER SCREENING MAMMOGRAM: ICD-10-CM

## 2025-05-13 PROCEDURE — 77063 BREAST TOMOSYNTHESIS BI: CPT

## (undated) DEVICE — Device

## (undated) DEVICE — NON-REM POLYHESIVE PATIENT RETURN ELECTRODE: Brand: VALLEYLAB

## (undated) DEVICE — SET ADMIN 16ML TBNG L100IN 2 Y INJ SITE IV PIGGY BK DISP

## (undated) DEVICE — NEEDLE HYPO 18GA L1.5IN PNK S STL HUB POLYPR SHLD REG BVL

## (undated) DEVICE — SYR 3ML LL TIP 1/10ML GRAD --

## (undated) DEVICE — STRAINER URIN CALC RNL MSH -- CONVERT TO ITEM 357634

## (undated) DEVICE — ELECTRODE,RADIOTRANSLUCENT,FOAM,5PK: Brand: MEDLINE

## (undated) DEVICE — FORCEPS BX L160CM DIA8MM GRSP DISECT CUP TIP NONLOCKING ROT

## (undated) DEVICE — SOLIDIFIER FLD 2OZ 1500CC N DISINF IN BTL DISP SAFESORB

## (undated) DEVICE — Z DISCONTINUED PER MEDLINE LINE GAS SAMPLING O2/CO2 LNG AD 13 FT NSL W/ TBNG FILTERLINE

## (undated) DEVICE — SNARE ENDOSCP L240CM LOOP W27MM SHTH DIA2.4MM WRK CHN 2.8MM

## (undated) DEVICE — CATH IV AUTOGRD BC PNK 20GA 25 -- INSYTE

## (undated) DEVICE — TRAP,MUCUS SPECIMEN, 80CC: Brand: MEDLINE

## (undated) DEVICE — SYR 10ML LUER LOK 1/5ML GRAD --

## (undated) DEVICE — 1200 GUARD II KIT W/5MM TUBE W/O VAC TUBE: Brand: GUARDIAN

## (undated) DEVICE — BAG SPEC BIOHZRD 10 X 10 IN --

## (undated) DEVICE — SNARE ENDOSCP M L240CM W27MM SHTH DIA2.4MM CHN 2.8MM OVL

## (undated) DEVICE — CATH REFLX PH Z IMPED 1CH 6.4F -- VERSAFLEX

## (undated) DEVICE — MEDI-VAC YANK SUCT HNDL W/TPRD BULBOUS TIP & NON-CONDUCTIVE: Brand: CARDINAL HEALTH

## (undated) DEVICE — BASIN EMSIS 16OZ GRAPHITE PLAS KID SHP MOLD GRAD FOR ORAL

## (undated) DEVICE — YANKAUER,TAPERED BULBOUS TIP,W/O VENT: Brand: MEDLINE

## (undated) DEVICE — BLOCK BITE ENDOSCP AD 21 MM W/ DIL BLU LF DISP

## (undated) DEVICE — TOWEL 4 PLY TISS 19X30 SUE WHT

## (undated) DEVICE — CONTAINER SPEC 20 ML LID NEUT BUFF FORMALIN 10 % POLYPR STS

## (undated) DEVICE — SYRINGE 50ML E/T

## (undated) DEVICE — NEONATAL-ADULT SPO2 SENSOR: Brand: NELLCOR